# Patient Record
Sex: FEMALE | Race: WHITE
[De-identification: names, ages, dates, MRNs, and addresses within clinical notes are randomized per-mention and may not be internally consistent; named-entity substitution may affect disease eponyms.]

---

## 2017-10-06 ENCOUNTER — RX ONLY (RX ONLY)
Age: 57
End: 2017-10-06

## 2017-10-06 ENCOUNTER — DOCTOR'S OFFICE (OUTPATIENT)
Dept: URBAN - NONMETROPOLITAN AREA CLINIC 1 | Facility: CLINIC | Age: 57
Setting detail: OPHTHALMOLOGY
End: 2017-10-06
Payer: COMMERCIAL

## 2017-10-06 DIAGNOSIS — H00.11: ICD-10-CM

## 2017-10-06 PROCEDURE — 99203 OFFICE O/P NEW LOW 30 MIN: CPT | Performed by: OPHTHALMOLOGY

## 2017-10-06 ASSESSMENT — REFRACTION_MANIFEST
OD_VA3: 20/
OD_VA2: 20/25
OS_SPHERE: -1.00
OS_CYLINDER: SPH
OD_CYLINDER: -0.75
OD_VA1: 20/25
OS_VA1: 20/
OS_VA2: 20/
OS_VA3: 20/
OS_VA1: 20/
OD_VA1: 20/
OD_VA1: 20/
OS_VA2: 20/25
OS_VA3: 20/
OU_VA: 20/
OS_VA1: 20/25
OU_VA: 20/
OD_ADD: +2.00
OS_VA3: 20/
OD_SPHERE: -1.00
OU_VA: 20/
OD_VA2: 20/
OS_VA2: 20/
OD_VA2: 20/
OS_ADD: +2.00
OD_VA3: 20/
OD_VA3: 20/
OD_AXIS: 180

## 2017-10-06 ASSESSMENT — REFRACTION_CURRENTRX
OD_OVR_VA: 20/
OD_OVR_VA: 20/
OS_OVR_VA: 20/
OS_OVR_VA: 20/
OD_OVR_VA: 20/
OS_OVR_VA: 20/

## 2017-10-06 ASSESSMENT — VISUAL ACUITY
OS_BCVA: 20/50-2
OD_BCVA: 20/40

## 2017-10-06 ASSESSMENT — CONFRONTATIONAL VISUAL FIELD TEST (CVF)
OD_FINDINGS: FULL
OS_FINDINGS: FULL

## 2017-10-06 ASSESSMENT — REFRACTION_AUTOREFRACTION
OD_AXIS: 176
OS_SPHERE: -1.00
OS_CYLINDER: -0.25
OD_SPHERE: -1.00
OD_CYLINDER: -0.75
OS_AXIS: 65

## 2017-10-06 ASSESSMENT — SPHEQUIV_DERIVED
OD_SPHEQUIV: -1.375
OD_SPHEQUIV: -1.375
OS_SPHEQUIV: -1.125

## 2017-10-09 ENCOUNTER — DOCTOR'S OFFICE (OUTPATIENT)
Dept: URBAN - NONMETROPOLITAN AREA CLINIC 1 | Facility: CLINIC | Age: 57
Setting detail: OPHTHALMOLOGY
End: 2017-10-09
Payer: COMMERCIAL

## 2017-10-09 ENCOUNTER — RX ONLY (RX ONLY)
Age: 57
End: 2017-10-09

## 2017-10-09 DIAGNOSIS — H00.11: ICD-10-CM

## 2017-10-09 PROCEDURE — 92012 INTRM OPH EXAM EST PATIENT: CPT | Performed by: OPHTHALMOLOGY

## 2017-10-09 ASSESSMENT — REFRACTION_MANIFEST
OU_VA: 20/
OD_VA3: 20/
OD_CYLINDER: -0.75
OD_VA3: 20/
OD_VA1: 20/25
OD_VA2: 20/
OS_CYLINDER: SPH
OS_VA2: 20/
OS_VA3: 20/
OD_VA1: 20/
OD_ADD: +2.00
OS_VA3: 20/
OS_ADD: +2.00
OS_VA1: 20/25
OU_VA: 20/
OS_VA2: 20/
OS_VA2: 20/25
OS_VA1: 20/
OD_VA2: 20/25
OS_SPHERE: -1.00
OD_VA2: 20/
OS_VA3: 20/
OD_VA1: 20/
OD_SPHERE: -1.00
OS_VA1: 20/
OD_VA3: 20/
OD_AXIS: 180
OU_VA: 20/

## 2017-10-09 ASSESSMENT — REFRACTION_CURRENTRX
OD_OVR_VA: 20/
OD_OVR_VA: 20/
OS_OVR_VA: 20/
OD_OVR_VA: 20/

## 2017-10-09 ASSESSMENT — REFRACTION_AUTOREFRACTION
OS_SPHERE: -1.00
OS_AXIS: 65
OD_CYLINDER: -0.75
OD_SPHERE: -1.00
OD_AXIS: 176
OS_CYLINDER: -0.25

## 2017-10-09 ASSESSMENT — SPHEQUIV_DERIVED
OS_SPHEQUIV: -1.125
OD_SPHEQUIV: -1.375
OD_SPHEQUIV: -1.375

## 2017-10-09 ASSESSMENT — VISUAL ACUITY
OS_BCVA: 20/50-2
OD_BCVA: 20/30-1

## 2017-12-11 ENCOUNTER — RX ONLY (RX ONLY)
Age: 57
End: 2017-12-11

## 2017-12-11 ENCOUNTER — DOCTOR'S OFFICE (OUTPATIENT)
Dept: URBAN - NONMETROPOLITAN AREA CLINIC 1 | Facility: CLINIC | Age: 57
Setting detail: OPHTHALMOLOGY
End: 2017-12-11
Payer: COMMERCIAL

## 2017-12-11 DIAGNOSIS — H43.812: ICD-10-CM

## 2017-12-11 DIAGNOSIS — H00.11: ICD-10-CM

## 2017-12-11 DIAGNOSIS — H44.23: ICD-10-CM

## 2017-12-11 DIAGNOSIS — H43.811: ICD-10-CM

## 2017-12-11 DIAGNOSIS — H43.813: ICD-10-CM

## 2017-12-11 PROCEDURE — 92014 COMPRE OPH EXAM EST PT 1/>: CPT | Performed by: OPHTHALMOLOGY

## 2017-12-11 PROCEDURE — 92225 OPHTHALMOSCOPY EXTENDED INITIAL: CPT | Performed by: OPHTHALMOLOGY

## 2017-12-11 ASSESSMENT — REFRACTION_MANIFEST
OD_VA1: 20/25
OD_VA3: 20/
OD_VA3: 20/
OS_VA3: 20/
OU_VA: 20/
OD_VA2: 20/
OS_ADD: +2.00
OS_VA1: 20/
OU_VA: 20/
OD_CYLINDER: -0.75
OS_CYLINDER: SPH
OD_SPHERE: -1.00
OS_VA3: 20/
OS_VA2: 20/
OS_VA3: 20/
OD_VA1: 20/
OS_VA2: 20/
OD_AXIS: 180
OD_VA3: 20/
OD_VA2: 20/
OS_VA2: 20/25
OD_VA1: 20/
OD_VA2: 20/25
OS_VA1: 20/25
OD_ADD: +2.00
OS_SPHERE: -1.00
OS_VA1: 20/
OU_VA: 20/

## 2017-12-11 ASSESSMENT — REFRACTION_AUTOREFRACTION
OS_CYLINDER: -0.25
OD_CYLINDER: -0.75
OD_SPHERE: -1.00
OS_AXIS: 65
OS_SPHERE: -1.00
OD_AXIS: 176

## 2017-12-11 ASSESSMENT — REFRACTION_CURRENTRX
OD_OVR_VA: 20/
OS_OVR_VA: 20/
OS_OVR_VA: 20/
OD_OVR_VA: 20/
OD_OVR_VA: 20/
OS_OVR_VA: 20/

## 2017-12-11 ASSESSMENT — VISUAL ACUITY
OS_BCVA: 20/50-2
OD_BCVA: 20/25

## 2017-12-11 ASSESSMENT — SPHEQUIV_DERIVED
OD_SPHEQUIV: -1.375
OS_SPHEQUIV: -1.125
OD_SPHEQUIV: -1.375

## 2017-12-11 ASSESSMENT — CONFRONTATIONAL VISUAL FIELD TEST (CVF)
OD_FINDINGS: FULL
OS_FINDINGS: FULL

## 2018-01-08 ENCOUNTER — OPTICAL OFFICE (OUTPATIENT)
Dept: URBAN - NONMETROPOLITAN AREA CLINIC 4 | Facility: CLINIC | Age: 58
Setting detail: OPHTHALMOLOGY
End: 2018-01-08
Payer: COMMERCIAL

## 2018-01-08 ENCOUNTER — DOCTOR'S OFFICE (OUTPATIENT)
Dept: URBAN - NONMETROPOLITAN AREA CLINIC 1 | Facility: CLINIC | Age: 58
Setting detail: OPHTHALMOLOGY
End: 2018-01-08
Payer: COMMERCIAL

## 2018-01-08 DIAGNOSIS — H52.13: ICD-10-CM

## 2018-01-08 DIAGNOSIS — H52.4: ICD-10-CM

## 2018-01-08 PROBLEM — H44.23 MYOPIC DEGENERATION; BOTH EYES: Status: ACTIVE | Noted: 2017-12-11

## 2018-01-08 PROBLEM — H43.811 POST VITREOUS DETACHMENT; RIGHT EYE, LEFT EYE: Status: ACTIVE | Noted: 2017-12-11

## 2018-01-08 PROBLEM — H43.812 POST VITREOUS DETACHMENT; RIGHT EYE, LEFT EYE: Status: ACTIVE | Noted: 2017-12-11

## 2018-01-08 PROBLEM — H25.13 CATARACT NUCLEAR SCLEROSIS; BOTH EYES: Status: ACTIVE | Noted: 2017-12-11

## 2018-01-08 PROBLEM — H00.11 CHALAZION; RIGHT UPPER LID: Status: ACTIVE | Noted: 2017-10-06

## 2018-01-08 PROCEDURE — V2200 LENS SPHER BIFOC PLANO 4.00D: HCPCS | Performed by: OPTOMETRIST

## 2018-01-08 PROCEDURE — 92015 DETERMINE REFRACTIVE STATE: CPT | Performed by: OPTOMETRIST

## 2018-01-08 PROCEDURE — V2020 VISION SVCS FRAMES PURCHASES: HCPCS | Performed by: OPTOMETRIST

## 2018-01-08 ASSESSMENT — REFRACTION_MANIFEST
OD_VA3: 20/
OD_VA1: 20/
OD_VA2: 20/
OS_VA1: 20/
OD_VA1: 20/
OU_VA: 20/
OD_VA2: 20/
OS_VA3: 20/
OS_VA2: 20/
OS_VA3: 20/
OD_VA3: 20/
OU_VA: 20/
OS_VA2: 20/
OS_VA1: 20/

## 2018-01-08 ASSESSMENT — REFRACTION_CURRENTRX
OS_OVR_VA: 20/
OS_OVR_VA: 20/
OD_OVR_VA: 20/
OS_OVR_VA: 20/

## 2018-01-08 ASSESSMENT — REFRACTION_OUTSIDERX
OD_VA3: 20/
OS_SPHERE: -0.75
OS_ADD: +2.25
OD_ADD: +2.25
OD_VA1: 20/20
OU_VA: 20/20
OS_VA3: 20/
OD_VA2: 20/25
OD_SPHERE: -0.75
OS_VA2: 20/25
OS_VA1: 20/20

## 2018-01-08 ASSESSMENT — REFRACTION_AUTOREFRACTION
OD_SPHERE: -0.50
OS_CYLINDER: -1.00
OD_CYLINDER: -0.75
OS_AXIS: 172
OD_AXIS: 7
OS_SPHERE: -0.50

## 2018-01-08 ASSESSMENT — SPHEQUIV_DERIVED
OD_SPHEQUIV: -0.875
OS_SPHEQUIV: -1

## 2018-01-08 ASSESSMENT — VISUAL ACUITY
OD_BCVA: 20/30-1
OS_BCVA: 20/50-2

## 2020-01-31 DIAGNOSIS — M54.2 CERVICALGIA: ICD-10-CM

## 2020-01-31 DIAGNOSIS — I10 ESSENTIAL (PRIMARY) HYPERTENSION: ICD-10-CM

## 2020-01-31 DIAGNOSIS — R00.2 PALPITATIONS: ICD-10-CM

## 2020-01-31 DIAGNOSIS — R51.9 HEADACHE: ICD-10-CM

## 2020-01-31 DIAGNOSIS — D35.01 BENIGN NEOPLASM OF RIGHT ADRENAL GLAND: ICD-10-CM

## 2020-01-31 DIAGNOSIS — R20.2 PARESTHESIA OF SKIN: ICD-10-CM

## 2020-01-31 DIAGNOSIS — R20.0 ANESTHESIA OF SKIN: ICD-10-CM

## 2020-01-31 DIAGNOSIS — E83.52 HYPERCALCEMIA: ICD-10-CM

## 2020-01-31 DIAGNOSIS — R47.9 UNSPECIFIED SPEECH DISTURBANCES: ICD-10-CM

## 2020-01-31 DIAGNOSIS — E21.3 HYPERPARATHYROIDISM, UNSPECIFIED (HCC): ICD-10-CM

## 2020-11-25 ENCOUNTER — TRANSCRIBE ORDERS (OUTPATIENT)
Dept: ADMINISTRATIVE | Facility: HOSPITAL | Age: 60
End: 2020-11-25

## 2020-11-25 ENCOUNTER — HOSPITAL ENCOUNTER (OUTPATIENT)
Dept: NUCLEAR MEDICINE | Facility: HOSPITAL | Age: 60
Discharge: HOME | End: 2020-11-25
Payer: COMMERCIAL

## 2020-11-25 DIAGNOSIS — F17.200 SMOKER: Primary | ICD-10-CM

## 2020-11-25 DIAGNOSIS — E83.52 HYPERCALCEMIA: ICD-10-CM

## 2020-11-25 PROCEDURE — 78071 PARATHYRD PLANAR W/WO SUBTRJ: CPT

## 2020-11-25 PROCEDURE — A9500 TC99M SESTAMIBI: HCPCS

## 2020-12-01 ENCOUNTER — HOSPITAL ENCOUNTER (OUTPATIENT)
Dept: CT IMAGING | Facility: HOSPITAL | Age: 60
Discharge: HOME | End: 2020-12-01
Payer: COMMERCIAL

## 2020-12-01 DIAGNOSIS — F17.200 SMOKER: ICD-10-CM

## 2020-12-01 PROCEDURE — G0297 LDCT FOR LUNG CA SCREEN: HCPCS

## 2021-03-10 DIAGNOSIS — Z23 ENCOUNTER FOR IMMUNIZATION: ICD-10-CM

## 2021-04-02 ENCOUNTER — HOSPITAL ENCOUNTER (OUTPATIENT)
Dept: ULTRASOUND IMAGING | Facility: HOSPITAL | Age: 61
Discharge: HOME | End: 2021-04-02
Payer: COMMERCIAL

## 2021-04-02 ENCOUNTER — HOSPITAL ENCOUNTER (OUTPATIENT)
Dept: NON INVASIVE DIAGNOSTICS | Facility: HOSPITAL | Age: 61
Discharge: HOME | End: 2021-04-02
Payer: COMMERCIAL

## 2021-04-02 DIAGNOSIS — I10 ESSENTIAL (PRIMARY) HYPERTENSION: ICD-10-CM

## 2021-04-02 DIAGNOSIS — M54.2 CERVICALGIA: ICD-10-CM

## 2021-04-02 DIAGNOSIS — R00.2 PALPITATIONS: ICD-10-CM

## 2021-04-02 PROCEDURE — 93880 EXTRACRANIAL BILAT STUDY: CPT

## 2021-04-02 PROCEDURE — 93880 EXTRACRANIAL BILAT STUDY: CPT | Performed by: SURGERY

## 2021-04-02 PROCEDURE — 76536 US EXAM OF HEAD AND NECK: CPT

## 2021-04-16 ENCOUNTER — IMMUNIZATIONS (OUTPATIENT)
Dept: FAMILY MEDICINE CLINIC | Facility: HOSPITAL | Age: 61
End: 2021-04-16

## 2021-04-16 DIAGNOSIS — Z23 ENCOUNTER FOR IMMUNIZATION: Primary | ICD-10-CM

## 2021-04-16 PROCEDURE — 91300 SARS-COV-2 / COVID-19 MRNA VACCINE (PFIZER-BIONTECH) 30 MCG: CPT

## 2021-04-16 PROCEDURE — 0001A SARS-COV-2 / COVID-19 MRNA VACCINE (PFIZER-BIONTECH) 30 MCG: CPT

## 2021-05-06 ENCOUNTER — IMMUNIZATIONS (OUTPATIENT)
Dept: FAMILY MEDICINE CLINIC | Facility: HOSPITAL | Age: 61
End: 2021-05-06
Payer: COMMERCIAL

## 2021-05-06 ENCOUNTER — APPOINTMENT (OUTPATIENT)
Dept: LAB | Facility: HOSPITAL | Age: 61
End: 2021-05-06
Attending: OTOLARYNGOLOGY
Payer: COMMERCIAL

## 2021-05-06 ENCOUNTER — TRANSCRIBE ORDERS (OUTPATIENT)
Dept: ADMINISTRATIVE | Facility: HOSPITAL | Age: 61
End: 2021-05-06

## 2021-05-06 DIAGNOSIS — R94.4 NONSPECIFIC ABNORMAL RESULTS OF KIDNEY FUNCTION STUDY: ICD-10-CM

## 2021-05-06 DIAGNOSIS — Z23 ENCOUNTER FOR IMMUNIZATION: Primary | ICD-10-CM

## 2021-05-06 DIAGNOSIS — R49.0 DYSPHONIA: Primary | ICD-10-CM

## 2021-05-06 LAB
BUN SERPL-MCNC: 19 MG/DL (ref 5–25)
CREAT SERPL-MCNC: 1.03 MG/DL (ref 0.6–1.3)
GFR SERPL CREATININE-BSD FRML MDRD: 60 ML/MIN/1.73SQ M

## 2021-05-06 PROCEDURE — 91300 SARS-COV-2 / COVID-19 MRNA VACCINE (PFIZER-BIONTECH) 30 MCG: CPT

## 2021-05-06 PROCEDURE — 84520 ASSAY OF UREA NITROGEN: CPT

## 2021-05-06 PROCEDURE — 82565 ASSAY OF CREATININE: CPT

## 2021-05-06 PROCEDURE — 0002A SARS-COV-2 / COVID-19 MRNA VACCINE (PFIZER-BIONTECH) 30 MCG: CPT

## 2021-05-06 PROCEDURE — 36415 COLL VENOUS BLD VENIPUNCTURE: CPT

## 2021-05-18 ENCOUNTER — HOSPITAL ENCOUNTER (OUTPATIENT)
Dept: CT IMAGING | Facility: HOSPITAL | Age: 61
Discharge: HOME | End: 2021-05-18
Attending: OTOLARYNGOLOGY
Payer: COMMERCIAL

## 2021-05-18 DIAGNOSIS — R49.0 DYSPHONIA: ICD-10-CM

## 2021-05-18 PROCEDURE — G1004 CDSM NDSC: HCPCS

## 2021-05-18 PROCEDURE — 70491 CT SOFT TISSUE NECK W/DYE: CPT

## 2021-05-18 RX ADMIN — IOHEXOL 85 ML: 350 INJECTION, SOLUTION INTRAVENOUS at 08:37

## 2021-08-24 ENCOUNTER — APPOINTMENT (OUTPATIENT)
Dept: LAB | Facility: HOSPITAL | Age: 61
End: 2021-08-24
Payer: COMMERCIAL

## 2021-08-24 ENCOUNTER — HOSPITAL ENCOUNTER (OUTPATIENT)
Dept: RADIOLOGY | Facility: HOSPITAL | Age: 61
Discharge: HOME | End: 2021-08-24
Attending: OTOLARYNGOLOGY
Payer: COMMERCIAL

## 2021-08-24 ENCOUNTER — HOSPITAL ENCOUNTER (OUTPATIENT)
Dept: RADIOLOGY | Facility: CLINIC | Age: 61
Discharge: HOME | End: 2021-08-24
Payer: COMMERCIAL

## 2021-08-24 DIAGNOSIS — E83.52 HYPERCALCEMIA: ICD-10-CM

## 2021-08-24 DIAGNOSIS — E21.3 HYPERPARATHYROIDISM (HCC): ICD-10-CM

## 2021-08-24 DIAGNOSIS — D35.01 ADRENAL ADENOMA, RIGHT: ICD-10-CM

## 2021-08-24 DIAGNOSIS — R06.2 WHEEZING: ICD-10-CM

## 2021-08-24 DIAGNOSIS — E21.3 HYPERPARATHYROIDISM, UNSPECIFIED (HCC): ICD-10-CM

## 2021-08-24 DIAGNOSIS — D35.01 BENIGN NEOPLASM OF RIGHT ADRENAL GLAND: ICD-10-CM

## 2021-08-24 PROCEDURE — 71046 X-RAY EXAM CHEST 2 VIEWS: CPT

## 2021-08-24 PROCEDURE — 77080 DXA BONE DENSITY AXIAL: CPT

## 2021-08-30 ENCOUNTER — APPOINTMENT (OUTPATIENT)
Dept: LAB | Facility: HOSPITAL | Age: 61
End: 2021-08-30
Payer: COMMERCIAL

## 2021-08-30 ENCOUNTER — HOSPITAL ENCOUNTER (OUTPATIENT)
Dept: CT IMAGING | Facility: HOSPITAL | Age: 61
Discharge: HOME | End: 2021-08-30
Payer: COMMERCIAL

## 2021-08-30 DIAGNOSIS — E21.3 HYPERPARATHYROIDISM, UNSPECIFIED (HCC): ICD-10-CM

## 2021-08-30 DIAGNOSIS — D35.01 BENIGN NEOPLASM OF RIGHT ADRENAL GLAND: ICD-10-CM

## 2021-08-30 DIAGNOSIS — E83.52 HYPERCALCEMIA: ICD-10-CM

## 2021-08-30 LAB
25(OH)D3 SERPL-MCNC: 60.6 NG/ML (ref 30–100)
ALBUMIN SERPL BCP-MCNC: 3.9 G/DL (ref 3.5–5)
ANION GAP SERPL CALCULATED.3IONS-SCNC: 12 MMOL/L (ref 4–13)
BUN SERPL-MCNC: 25 MG/DL (ref 5–25)
CALCIUM SERPL-MCNC: 10.2 MG/DL (ref 8.3–10.1)
CHLORIDE SERPL-SCNC: 108 MMOL/L (ref 100–108)
CO2 SERPL-SCNC: 23 MMOL/L (ref 21–32)
CREAT SERPL-MCNC: 1.22 MG/DL (ref 0.6–1.3)
GFR SERPL CREATININE-BSD FRML MDRD: 49 ML/MIN/1.73SQ M
GLUCOSE P FAST SERPL-MCNC: 104 MG/DL (ref 65–99)
PHOSPHATE SERPL-MCNC: 3.6 MG/DL (ref 2.7–4.5)
POTASSIUM SERPL-SCNC: 4.5 MMOL/L (ref 3.5–5.3)
PTH-INTACT SERPL-MCNC: 104.1 PG/ML (ref 18.4–80.1)
SODIUM SERPL-SCNC: 143 MMOL/L (ref 136–145)
TSH SERPL DL<=0.05 MIU/L-ACNC: 2.29 UIU/ML (ref 0.36–3.74)

## 2021-08-30 PROCEDURE — 82306 VITAMIN D 25 HYDROXY: CPT

## 2021-08-30 PROCEDURE — 74170 CT ABD WO CNTRST FLWD CNTRST: CPT

## 2021-08-30 PROCEDURE — 80069 RENAL FUNCTION PANEL: CPT

## 2021-08-30 PROCEDURE — 84443 ASSAY THYROID STIM HORMONE: CPT

## 2021-08-30 PROCEDURE — 83970 ASSAY OF PARATHORMONE: CPT

## 2021-08-30 RX ADMIN — IOHEXOL 100 ML: 350 INJECTION, SOLUTION INTRAVENOUS at 08:32

## 2021-09-01 ENCOUNTER — APPOINTMENT (OUTPATIENT)
Dept: LAB | Facility: HOSPITAL | Age: 61
End: 2021-09-01
Payer: COMMERCIAL

## 2021-09-01 DIAGNOSIS — D35.01 ADRENAL ADENOMA, RIGHT: ICD-10-CM

## 2021-09-01 DIAGNOSIS — E21.3 HYPERPARATHYROIDISM (HCC): ICD-10-CM

## 2021-09-01 DIAGNOSIS — E83.52 HYPERCALCEMIA: ICD-10-CM

## 2021-09-01 LAB
ALBUMIN SERPL BCP-MCNC: 3.5 G/DL (ref 3.5–5)
ANION GAP SERPL CALCULATED.3IONS-SCNC: 10 MMOL/L (ref 4–13)
BUN SERPL-MCNC: 22 MG/DL (ref 5–25)
CALCIUM SERPL-MCNC: 9.9 MG/DL (ref 8.3–10.1)
CHLORIDE SERPL-SCNC: 108 MMOL/L (ref 100–108)
CO2 SERPL-SCNC: 24 MMOL/L (ref 21–32)
CREAT SERPL-MCNC: 0.99 MG/DL (ref 0.6–1.3)
GFR SERPL CREATININE-BSD FRML MDRD: 63 ML/MIN/1.73SQ M
GLUCOSE P FAST SERPL-MCNC: 107 MG/DL (ref 65–99)
PHOSPHATE SERPL-MCNC: 3 MG/DL (ref 2.7–4.5)
POTASSIUM SERPL-SCNC: 4.2 MMOL/L (ref 3.5–5.3)
SODIUM SERPL-SCNC: 142 MMOL/L (ref 136–145)

## 2021-09-01 PROCEDURE — 82308 ASSAY OF CALCITONIN: CPT

## 2021-09-01 PROCEDURE — 83835 ASSAY OF METANEPHRINES: CPT

## 2021-09-01 PROCEDURE — 80069 RENAL FUNCTION PANEL: CPT

## 2021-09-04 LAB — CALCIT SERPL-MCNC: 2.3 PG/ML (ref 0–5)

## 2021-09-09 ENCOUNTER — APPOINTMENT (OUTPATIENT)
Dept: LAB | Facility: HOSPITAL | Age: 61
End: 2021-09-09
Payer: COMMERCIAL

## 2021-09-09 LAB
CALCIUM 24H UR-MCNC: 72.15 MG/24 HRS (ref 42–353)
CREAT 24H UR-MRATE: 1 G/24HR (ref 0.6–1.8)
SPECIMEN VOL UR: 650 ML
SPECIMEN VOL UR: 650 ML

## 2021-09-09 PROCEDURE — 82340 ASSAY OF CALCIUM IN URINE: CPT

## 2021-09-09 PROCEDURE — 82570 ASSAY OF URINE CREATININE: CPT

## 2021-09-09 PROCEDURE — 82530 CORTISOL FREE: CPT

## 2021-09-10 LAB
METANEPH FREE SERPL-MCNC: 67.3 PG/ML (ref 0–88)
NORMETANEPHRINE SERPL-MCNC: 113.5 PG/ML (ref 0–136.8)

## 2021-09-16 LAB
CORTIS F 24H UR-MRATE: 20 UG/24 HR (ref 6–42)
CORTIS F UR-MCNC: 30 UG/L

## 2021-10-18 ENCOUNTER — APPOINTMENT (OUTPATIENT)
Dept: LAB | Facility: HOSPITAL | Age: 61
End: 2021-10-18
Payer: COMMERCIAL

## 2021-10-18 DIAGNOSIS — E21.0 PRIMARY HYPERPARATHYROIDISM (HCC): ICD-10-CM

## 2021-10-18 DIAGNOSIS — Z01.818 PRE-OP EVALUATION: ICD-10-CM

## 2021-10-18 LAB
BUN SERPL-MCNC: 22 MG/DL (ref 5–25)
CREAT SERPL-MCNC: 0.9 MG/DL (ref 0.6–1.3)
GFR SERPL CREATININE-BSD FRML MDRD: 70 ML/MIN/1.73SQ M

## 2021-10-18 PROCEDURE — 84520 ASSAY OF UREA NITROGEN: CPT

## 2021-10-18 PROCEDURE — 36415 COLL VENOUS BLD VENIPUNCTURE: CPT

## 2021-10-18 PROCEDURE — 82565 ASSAY OF CREATININE: CPT

## 2021-12-07 ENCOUNTER — OFFICE VISIT (OUTPATIENT)
Dept: LAB | Facility: HOSPITAL | Age: 61
End: 2021-12-07
Payer: COMMERCIAL

## 2021-12-07 DIAGNOSIS — Z01.818 PREOP TESTING: ICD-10-CM

## 2021-12-07 LAB
ATRIAL RATE: 50 BPM
P AXIS: 66 DEGREES
PR INTERVAL: 138 MS
QRS AXIS: 83 DEGREES
QRSD INTERVAL: 100 MS
QT INTERVAL: 408 MS
QTC INTERVAL: 371 MS
T WAVE AXIS: 74 DEGREES
VENTRICULAR RATE: 50 BPM

## 2021-12-07 PROCEDURE — 93005 ELECTROCARDIOGRAM TRACING: CPT

## 2021-12-07 PROCEDURE — 93010 ELECTROCARDIOGRAM REPORT: CPT | Performed by: INTERNAL MEDICINE

## 2022-01-19 ENCOUNTER — HOSPITAL ENCOUNTER (OUTPATIENT)
Dept: RADIOLOGY | Facility: HOSPITAL | Age: 62
Discharge: HOME | End: 2022-01-19
Payer: COMMERCIAL

## 2022-01-19 DIAGNOSIS — M70.62 TROCHANTERIC BURSITIS OF LEFT HIP: ICD-10-CM

## 2022-01-19 DIAGNOSIS — M25.552 HIP PAIN, LEFT: ICD-10-CM

## 2022-01-19 PROCEDURE — 73502 X-RAY EXAM HIP UNI 2-3 VIEWS: CPT

## 2022-07-13 ENCOUNTER — OFFICE VISIT (OUTPATIENT)
Dept: LAB | Facility: HOSPITAL | Age: 62
End: 2022-07-13
Payer: COMMERCIAL

## 2022-07-13 DIAGNOSIS — Z01.818 PREOP TESTING: ICD-10-CM

## 2022-07-13 PROCEDURE — 93005 ELECTROCARDIOGRAM TRACING: CPT

## 2022-07-14 LAB
ATRIAL RATE: 43 BPM
P AXIS: 69 DEGREES
PR INTERVAL: 146 MS
QRS AXIS: 86 DEGREES
QRSD INTERVAL: 94 MS
QT INTERVAL: 426 MS
QTC INTERVAL: 359 MS
T WAVE AXIS: 71 DEGREES
VENTRICULAR RATE: 43 BPM

## 2023-08-20 ENCOUNTER — HOSPITAL ENCOUNTER (EMERGENCY)
Facility: HOSPITAL | Age: 63
Discharge: HOME | End: 2023-08-20
Attending: EMERGENCY MEDICINE | Admitting: EMERGENCY MEDICINE
Payer: COMMERCIAL

## 2023-08-20 ENCOUNTER — APPOINTMENT (EMERGENCY)
Dept: CT IMAGING | Facility: HOSPITAL | Age: 63
End: 2023-08-20
Payer: COMMERCIAL

## 2023-08-20 VITALS
WEIGHT: 175 LBS | RESPIRATION RATE: 20 BRPM | SYSTOLIC BLOOD PRESSURE: 106 MMHG | TEMPERATURE: 97.8 F | OXYGEN SATURATION: 95 % | DIASTOLIC BLOOD PRESSURE: 65 MMHG | HEART RATE: 72 BPM

## 2023-08-20 DIAGNOSIS — T14.8XXA MUSCLE STRAIN: Primary | ICD-10-CM

## 2023-08-20 DIAGNOSIS — R74.8 ELEVATED CREATINE KINASE: ICD-10-CM

## 2023-08-20 LAB
2HR DELTA HS TROPONIN: 0 NG/L
ALBUMIN SERPL BCP-MCNC: 4.5 G/DL (ref 3.5–5)
ALP SERPL-CCNC: 59 U/L (ref 34–104)
ALT SERPL W P-5'-P-CCNC: 33 U/L (ref 7–52)
ANION GAP SERPL CALCULATED.3IONS-SCNC: 9 MMOL/L
APTT PPP: 30 SECONDS (ref 23–37)
AST SERPL W P-5'-P-CCNC: 23 U/L (ref 13–39)
BASOPHILS # BLD AUTO: 0.04 THOUSANDS/ÂΜL (ref 0–0.1)
BASOPHILS NFR BLD AUTO: 1 % (ref 0–1)
BILIRUB SERPL-MCNC: 0.41 MG/DL (ref 0.2–1)
BNP SERPL-MCNC: 52 PG/ML (ref 0–100)
BUN SERPL-MCNC: 35 MG/DL (ref 5–25)
CALCIUM SERPL-MCNC: 9.1 MG/DL (ref 8.4–10.2)
CARDIAC TROPONIN I PNL SERPL HS: 3 NG/L
CARDIAC TROPONIN I PNL SERPL HS: 3 NG/L
CHLORIDE SERPL-SCNC: 111 MMOL/L (ref 96–108)
CO2 SERPL-SCNC: 22 MMOL/L (ref 21–32)
CREAT SERPL-MCNC: 1.73 MG/DL (ref 0.6–1.3)
EOSINOPHIL # BLD AUTO: 0.11 THOUSAND/ÂΜL (ref 0–0.61)
EOSINOPHIL NFR BLD AUTO: 2 % (ref 0–6)
ERYTHROCYTE [DISTWIDTH] IN BLOOD BY AUTOMATED COUNT: 13 % (ref 11.6–15.1)
GFR SERPL CREATININE-BSD FRML MDRD: 31 ML/MIN/1.73SQ M
GLUCOSE SERPL-MCNC: 90 MG/DL (ref 65–140)
HCT VFR BLD AUTO: 38 % (ref 34.8–46.1)
HGB BLD-MCNC: 12.4 G/DL (ref 11.5–15.4)
IMM GRANULOCYTES # BLD AUTO: 0.02 THOUSAND/UL (ref 0–0.2)
IMM GRANULOCYTES NFR BLD AUTO: 0 % (ref 0–2)
INR PPP: 0.93 (ref 0.84–1.19)
LYMPHOCYTES # BLD AUTO: 2.06 THOUSANDS/ÂΜL (ref 0.6–4.47)
LYMPHOCYTES NFR BLD AUTO: 33 % (ref 14–44)
MCH RBC QN AUTO: 32.5 PG (ref 26.8–34.3)
MCHC RBC AUTO-ENTMCNC: 32.6 G/DL (ref 31.4–37.4)
MCV RBC AUTO: 100 FL (ref 82–98)
MONOCYTES # BLD AUTO: 0.54 THOUSAND/ÂΜL (ref 0.17–1.22)
MONOCYTES NFR BLD AUTO: 9 % (ref 4–12)
NEUTROPHILS # BLD AUTO: 3.44 THOUSANDS/ÂΜL (ref 1.85–7.62)
NEUTS SEG NFR BLD AUTO: 55 % (ref 43–75)
NRBC BLD AUTO-RTO: 0 /100 WBCS
PLATELET # BLD AUTO: 249 THOUSANDS/UL (ref 149–390)
PMV BLD AUTO: 10.7 FL (ref 8.9–12.7)
POTASSIUM SERPL-SCNC: 4.4 MMOL/L (ref 3.5–5.3)
PROT SERPL-MCNC: 6.8 G/DL (ref 6.4–8.4)
PROTHROMBIN TIME: 12.8 SECONDS (ref 11.6–14.5)
RBC # BLD AUTO: 3.81 MILLION/UL (ref 3.81–5.12)
SODIUM SERPL-SCNC: 142 MMOL/L (ref 135–147)
WBC # BLD AUTO: 6.21 THOUSAND/UL (ref 4.31–10.16)

## 2023-08-20 PROCEDURE — 36415 COLL VENOUS BLD VENIPUNCTURE: CPT | Performed by: PHYSICIAN ASSISTANT

## 2023-08-20 PROCEDURE — 93005 ELECTROCARDIOGRAM TRACING: CPT

## 2023-08-20 PROCEDURE — 80053 COMPREHEN METABOLIC PANEL: CPT | Performed by: PHYSICIAN ASSISTANT

## 2023-08-20 PROCEDURE — 83880 ASSAY OF NATRIURETIC PEPTIDE: CPT | Performed by: PHYSICIAN ASSISTANT

## 2023-08-20 PROCEDURE — 71250 CT THORAX DX C-: CPT

## 2023-08-20 PROCEDURE — 84484 ASSAY OF TROPONIN QUANT: CPT | Performed by: PHYSICIAN ASSISTANT

## 2023-08-20 PROCEDURE — 99285 EMERGENCY DEPT VISIT HI MDM: CPT

## 2023-08-20 PROCEDURE — 99285 EMERGENCY DEPT VISIT HI MDM: CPT | Performed by: PHYSICIAN ASSISTANT

## 2023-08-20 PROCEDURE — 85730 THROMBOPLASTIN TIME PARTIAL: CPT | Performed by: PHYSICIAN ASSISTANT

## 2023-08-20 PROCEDURE — 96376 TX/PRO/DX INJ SAME DRUG ADON: CPT

## 2023-08-20 PROCEDURE — 85025 COMPLETE CBC W/AUTO DIFF WBC: CPT | Performed by: PHYSICIAN ASSISTANT

## 2023-08-20 PROCEDURE — 85610 PROTHROMBIN TIME: CPT | Performed by: PHYSICIAN ASSISTANT

## 2023-08-20 PROCEDURE — 96374 THER/PROPH/DIAG INJ IV PUSH: CPT

## 2023-08-20 PROCEDURE — G1004 CDSM NDSC: HCPCS

## 2023-08-20 RX ORDER — LIDOCAINE 50 MG/G
1 PATCH TOPICAL DAILY
Qty: 6 PATCH | Refills: 0 | Status: SHIPPED | OUTPATIENT
Start: 2023-08-20

## 2023-08-20 RX ORDER — MORPHINE SULFATE 4 MG/ML
4 INJECTION, SOLUTION INTRAMUSCULAR; INTRAVENOUS ONCE
Status: COMPLETED | OUTPATIENT
Start: 2023-08-20 | End: 2023-08-20

## 2023-08-20 RX ORDER — LIDOCAINE 50 MG/G
1 PATCH TOPICAL ONCE
Status: DISCONTINUED | OUTPATIENT
Start: 2023-08-20 | End: 2023-08-20 | Stop reason: HOSPADM

## 2023-08-20 RX ORDER — NAPROXEN 500 MG/1
500 TABLET ORAL 2 TIMES DAILY WITH MEALS
Qty: 10 TABLET | Refills: 0 | Status: SHIPPED | OUTPATIENT
Start: 2023-08-20 | End: 2023-08-25

## 2023-08-20 RX ADMIN — MORPHINE SULFATE 4 MG: 4 INJECTION INTRAVENOUS at 20:25

## 2023-08-20 RX ADMIN — MORPHINE SULFATE 4 MG: 4 INJECTION INTRAVENOUS at 18:15

## 2023-08-20 RX ADMIN — LIDOCAINE 5% 1 PATCH: 700 PATCH TOPICAL at 18:15

## 2023-08-20 NOTE — Clinical Note
Carmelo Freire was seen and treated in our emergency department on 8/20/2023. Diagnosis:     Severo Camel  may return to work on return date. She may return on this date: 08/22/2023    Should not lift greater than 5 pounds until 8/28/23     If you have any questions or concerns, please don't hesitate to call.       Donette Harada, PA-C    ______________________________           _______________          _______________  Hospital Representative                              Date                                Time

## 2023-08-20 NOTE — ED ATTENDING ATTESTATION
8/20/2023  I, Gene Christensen MD, saw and evaluated the patient. I have discussed the patient with the resident/non-physician practitioner and agree with the resident's/non-physician practitioner's findings, Plan of Care, and MDM as documented in the resident's/non-physician practitioner's note, except where noted. All available labs and Radiology studies were reviewed. I was present for key portions of any procedure(s) performed by the resident/non-physician practitioner and I was immediately available to provide assistance. At this point I agree with the current assessment done in the Emergency Department. I have conducted an independent evaluation of this patient a history and physical is as follows:    ED Course     Patient was pulling a corn stock out of the ground on Friday and developed sudden onset of left-sided pain. Felt a pop. Hurts to take a deep breath. Hurts to move. On exam the patient is awake alert. Lungs are clear to auscultation. Chest is tender to palpation along the lateral aspect of the left lower ribs. No obvious deformity or crepitus noted. Abdomen soft nontender good bowel sounds.     Critical Care Time  Procedures

## 2023-08-20 NOTE — ED PROVIDER NOTES
History  Chief Complaint   Patient presents with   • Chest Pain     Pt reports L chest wall pain beginning Friday after pulling a corn stalk out of the ground. Reports pain, SOB at triage      53-year-old female presents the emergency department for evaluation of left sided rib/chest wall pain. Patient states this began suddenly Friday after pulling out of cornstarch. States she had sharp shooting pain immediately and has been consistently worsening since. Patient reports shortness of breath. States pain is worse with certain movements or positions. She denies any palpitations or leg swelling. Denies nausea or vomiting. She has been taking 800 mg Motrin with no significant improvement of symptoms. Is a current everyday smoker. None       History reviewed. No pertinent past medical history. History reviewed. No pertinent surgical history. History reviewed. No pertinent family history. I have reviewed and agree with the history as documented. E-Cigarette/Vaping     E-Cigarette/Vaping Substances     Social History     Tobacco Use   • Smoking status: Every Day     Packs/day: 1.50     Types: Cigarettes   • Smokeless tobacco: Never   Substance Use Topics   • Alcohol use: Yes     Comment: social   • Drug use: Never       Review of Systems   Constitutional: Negative for appetite change, fatigue and fever. Respiratory: Positive for shortness of breath. Cardiovascular: Negative for palpitations and leg swelling. Chest wall pain   Gastrointestinal: Negative. Musculoskeletal: Negative. Skin: Negative. Neurological: Negative. All other systems reviewed and are negative. Physical Exam  Physical Exam  Vitals and nursing note reviewed. Constitutional:       General: She is not in acute distress. Appearance: She is well-developed. She is not ill-appearing, toxic-appearing or diaphoretic. HENT:      Head: Normocephalic.    Eyes:      Pupils: Pupils are equal, round, and reactive to light. Cardiovascular:      Rate and Rhythm: Normal rate and regular rhythm. Pulmonary:      Effort: Tachypnea present. Breath sounds: No decreased breath sounds, wheezing, rhonchi or rales. Chest:      Chest wall: Tenderness present. Comments: Tenderness to palpation with no overlying skin abnormalities  Abdominal:      General: Bowel sounds are normal. There is no abdominal bruit. Palpations: Abdomen is soft. Tenderness: There is no abdominal tenderness. Musculoskeletal:         General: Normal range of motion. Right lower leg: No tenderness. No edema. Left lower leg: No tenderness. No edema. Skin:     General: Skin is warm and dry. Findings: No ecchymosis or erythema. Neurological:      General: No focal deficit present. Mental Status: She is alert and oriented to person, place, and time. Psychiatric:         Mood and Affect: Mood is anxious.          Vital Signs  ED Triage Vitals   Temperature Pulse Respirations Blood Pressure SpO2   08/20/23 1801 08/20/23 1801 08/20/23 1801 08/20/23 1801 08/20/23 1801   97.8 °F (36.6 °C) 77 (!) 32 151/94 94 %      Temp Source Heart Rate Source Patient Position - Orthostatic VS BP Location FiO2 (%)   08/20/23 1801 08/20/23 1815 08/20/23 1801 08/20/23 1801 --   Temporal Monitor Lying Right arm       Pain Score       08/20/23 1801       10 - Worst Possible Pain           Vitals:    08/20/23 1830 08/20/23 2000 08/20/23 2025 08/20/23 2030   BP: 131/74 97/64 104/62 106/65   Pulse: 64 72 67 72   Patient Position - Orthostatic VS: Lying Lying Lying Lying         Visual Acuity      ED Medications  Medications   lidocaine (LIDODERM) 5 % patch 1 patch (1 patch Topical Medication Applied 8/20/23 1815)   morphine injection 4 mg (4 mg Intravenous Given 8/20/23 1815)   morphine injection 4 mg (4 mg Intravenous Given 8/20/23 2025)       Diagnostic Studies  Results Reviewed     Procedure Component Value Units Date/Time    HS Troponin I 2hr [[id number]]  (Normal) Collected: 08/20/23 2022    Lab Status: Final result Specimen: Blood from Arm, Left Updated: 08/20/23 2048     hs TnI 2hr 3 ng/L      Delta 2hr hsTnI 0 ng/L     HS Troponin I 4hr [[id number]]     Lab Status: No result Specimen: Blood     B-Type Natriuretic Peptide(BNP) [[id number]]  (Normal) Collected: 08/20/23 1814    Lab Status: Final result Specimen: Blood from Arm, Left Updated: 08/20/23 1845     BNP 52 pg/mL     HS Troponin 0hr (reflex protocol) [[id number]]  (Normal) Collected: 08/20/23 1814    Lab Status: Final result Specimen: Blood from Arm, Left Updated: 08/20/23 1842     hs TnI 0hr 3 ng/L     Comprehensive metabolic panel [[id number]]  (Abnormal) Collected: 08/20/23 1814    Lab Status: Final result Specimen: Blood from Arm, Left Updated: 08/20/23 1838     Sodium 142 mmol/L      Potassium 4.4 mmol/L      Chloride 111 mmol/L      CO2 22 mmol/L      ANION GAP 9 mmol/L      BUN 35 mg/dL      Creatinine 1.73 mg/dL      Glucose 90 mg/dL      Calcium 9.1 mg/dL      AST 23 U/L      ALT 33 U/L      Alkaline Phosphatase 59 U/L      Total Protein 6.8 g/dL      Albumin 4.5 g/dL      Total Bilirubin 0.41 mg/dL      eGFR 31 ml/min/1.73sq m     Narrative:      [facility] guidelines for Chronic Kidney Disease (CKD):   •  Stage 1 with normal or high GFR (GFR > 90 mL/min/1.73 square meters)  •  Stage 2 Mild CKD (GFR = 60-89 mL/min/1.73 square meters)  •  Stage 3A Moderate CKD (GFR = 45-59 mL/min/1.73 square meters)  •  Stage 3B Moderate CKD (GFR = 30-44 mL/min/1.73 square meters)  •  Stage 4 Severe CKD (GFR = 15-29 mL/min/1.73 square meters)  •  Stage 5 End Stage CKD (GFR <15 mL/min/1.73 square meters)  Note: GFR calculation is accurate only with a steady state creatinine    Protime-INR [[id number]]  (Normal) Collected: 08/20/23 1814    Lab Status: Final result Specimen: Blood from Arm, Left Updated: 08/20/23 1830     Protime 12.8 seconds      INR 0.93    APTT [[id number]]  (Normal) Collected: 08/20/23 1814    Lab Status: Final result Specimen: Blood from Arm, Left Updated: 08/20/23 1830     PTT 30 seconds     CBC and differential [[id number]]  (Abnormal) Collected: 08/20/23 1814    Lab Status: Final result Specimen: Blood from Arm, Left Updated: 08/20/23 1820     WBC 6.21 Thousand/uL      RBC 3.81 Million/uL      Hemoglobin 12.4 g/dL      Hematocrit 38.0 %       fL      MCH 32.5 pg      MCHC 32.6 g/dL      RDW 13.0 %      MPV 10.7 fL      Platelets 032 Thousands/uL      nRBC 0 /100 WBCs      Neutrophils Relative 55 %      Immat GRANS % 0 %      Lymphocytes Relative 33 %      Monocytes Relative 9 %      Eosinophils Relative 2 %      Basophils Relative 1 %      Neutrophils Absolute 3.44 Thousands/µL      Immature Grans Absolute 0.02 Thousand/uL      Lymphocytes Absolute 2.06 Thousands/µL      Monocytes Absolute 0.54 Thousand/µL      Eosinophils Absolute 0.11 Thousand/µL      Basophils Absolute 0.04 Thousands/µL                  CT chest without contrast   Final Result by Radha Beckman MD (08/20 2042)      No evidence of acute intrathoracic pathology.       Stable upper lobe predominant micronodularity which may reflect respiratory bronchiolitis given patient's smoking history               Workstation performed: [id number]                    Procedures  ECG 12 Lead Documentation Only    Date/Time: 8/20/2023 6:15 PM    Performed by: Mohini Claudio PA-C  Authorized by: Mohini Claudio PA-C    Indications / Diagnosis:  Chest pain  Patient location:  ED  Interpretation:     Interpretation: non-specific    Rate:     ECG rate:  79    ECG rate assessment: normal    Rhythm:     Rhythm: sinus rhythm    Ectopy:     Ectopy: none    QRS:     QRS axis:  Normal    QRS intervals:  Normal  Conduction:     Conduction: normal    ST segments:     ST segments:  Normal  T waves:     T waves: normal               ED Course  ED Course as of 08/20/23 2049   Sun Aug 20, 2023   1842 WBC: 6.21 1847 hs TnI 0hr: 3   1847 Creatinine(!): 1.73  Elevated from baseline. Will have patient follow up with PCP for recheck    1937 Patient has had improvement of symptoms with morphine and lidocaine patch. CT pending   2047 CT: No evidence of acute intrathoracic pathology.     Stable upper lobe predominant micronodularity which may reflect respiratory bronchiolitis given patient's smoking history      2047 Discussed all results and findings with the patient and family. Discussed symptomatic care return precautions and appropriate follow-up             HEART Risk Score    Flowsheet Row Most Recent Value   Heart Score Risk Calculator    History 0 Filed at: 08/20/2023 1843   ECG 0 Filed at: 08/20/2023 1843   Age 1 Filed at: 08/20/2023 1843   Risk Factors 1 Filed at: 08/20/2023 1843   Troponin 0 Filed at: 08/20/2023 1843   HEART Score 2 Filed at: 08/20/2023 1843                        SBIRT 20yo+    Flowsheet Row Most Recent Value   Initial Alcohol Screen: US AUDIT-C     1. How often do you have a drink containing alcohol? 3 Filed at: 08/20/2023 1810   2. How many drinks containing alcohol do you have on a typical day you are drinking? 1 Filed at: 08/20/2023 1810   3b. FEMALE Any Age, or MALE 65+: How often do you have 4 or more drinks on one occassion? 0 Filed at: 08/20/2023 1810   Audit-C Score 4 Filed at: 08/20/2023 1810   ANDREINA: How many times in the past year have you. .. Used an illegal drug or used a prescription medication for non-medical reasons? Never Filed at: 08/20/2023 1810                    Medical Decision Making  74-year-old female presented to the emergency department for evaluation of left-sided chest wall tenderness status post pulling out a corn stock on Friday. Vitals and medical record reviewed. Reproducible left-sided tenderness. Normal and equal breath sounds.   Patient at risk for muscular strain, rib fracture, pneumothorax, MI.  EKG nonischemic, troponin within normal limits, low concern for MI. Chest x-ray negative for pneumothorax or rib fracture. Will treat at muscular injury. Will have patient follow up with PCP. Discussed the importance of deep inspiration patient verbalized understanding. She was clinically and hemodynamically stable for discharge    Elevated creatine kinase: acute illness or injury  Muscle strain: acute illness or injury  Amount and/or Complexity of Data Reviewed  Labs: ordered. Decision-making details documented in ED Course. Radiology: ordered. Risk  Prescription drug management. Disposition  Final diagnoses:   Muscle strain   Elevated creatine kinase     Time reflects when diagnosis was documented in both MDM as applicable and the Disposition within this note     Time User Action Codes Description Comment    8/20/2023  8:18 PM José Miguel Mail. 8XXA] Muscle strain     8/20/2023  8:45 PM Miladis Kapadia Add [R79.89] Elevated serum creatinine     8/20/2023  8:45 PM Miladis Kapadia Remove [R79.89] Elevated serum creatinine     8/20/2023  8:45 PM Miladis Kapadia Add [R74.8] Elevated creatine kinase       ED Disposition     ED Disposition   Discharge    Condition   Stable    Date/Time   Sun Aug 20, 2023  8:18 PM    Comment   Tank Mckinley discharge to home/self care.                Follow-up Information     Follow up With Specialties Details Why Janneth Olivo MD Family Medicine In 3 days recheck of the kidney function [address]  [phone]            Patient's Medications   Discharge Prescriptions    LIDOCAINE (LIDODERM) 5 %    Apply 1 patch topically over 12 hours daily Remove & Discard patch within 12 hours or as directed by MD       Start Date: 8/20/2023 End Date: --       Order Dose: 1 patch       Quantity: 6 patch    Refills: 0    NAPROXEN (NAPROSYN) 500 MG TABLET    Take 1 tablet (500 mg total) by mouth 2 (two) times a day with meals for 5 days       Start Date: 8/20/2023 End Date: 8/25/2023 Order Dose: 500 mg       Quantity: 10 tablet    Refills: 0       No discharge procedures on file.     PDMP Review     None          ED Provider  Electronically Signed by           Maddie Castro PA-C  08/20/23 3126

## 2023-08-21 LAB
ATRIAL RATE: 79 BPM
P AXIS: 44 DEGREES
PR INTERVAL: 136 MS
QRS AXIS: 74 DEGREES
QRSD INTERVAL: 78 MS
QT INTERVAL: 386 MS
QTC INTERVAL: 442 MS
T WAVE AXIS: 74 DEGREES
VENTRICULAR RATE: 79 BPM

## 2023-08-21 NOTE — DISCHARGE INSTRUCTIONS
Please continue to take deep breaths to help avoid developing a pneumonia. Lidocaine patches have been sent to your pharmacy. Alternate between Tylenol and ibuprofen as needed. Please follow-up with your family doctor and return with any new or worsening symptoms  Your kidney function was elevated today and this should be rechecked by your PCP. Please stay will hydrated in the mean time.

## 2024-01-31 ENCOUNTER — HOSPITAL ENCOUNTER (OUTPATIENT)
Dept: NON INVASIVE DIAGNOSTICS | Facility: HOSPITAL | Age: 64
Discharge: HOME | End: 2024-01-31
Payer: COMMERCIAL

## 2024-01-31 VITALS — WEIGHT: 175 LBS | HEIGHT: 68 IN | BODY MASS INDEX: 26.52 KG/M2

## 2024-01-31 DIAGNOSIS — R06.02 SHORTNESS OF BREATH: ICD-10-CM

## 2024-01-31 DIAGNOSIS — F17.200 TOBACCO USE DISORDER: ICD-10-CM

## 2024-01-31 DIAGNOSIS — J43.8 OTHER EMPHYSEMA (HCC): ICD-10-CM

## 2024-01-31 DIAGNOSIS — I10 HYPERTENSION GOAL BP (BLOOD PRESSURE) < 140/90: ICD-10-CM

## 2024-01-31 DIAGNOSIS — J44.9 COPD, GROUP B, BY GOLD 2017 CLASSIFICATION (HCC): ICD-10-CM

## 2024-01-31 DIAGNOSIS — I25.10 ATHEROSCLEROSIS OF NATIVE CORONARY ARTERY WITHOUT ANGINA PECTORIS, UNSPECIFIED WHETHER NATIVE OR TRANSPLANTED HEART: ICD-10-CM

## 2024-01-31 LAB
MAX HR PERCENT: 90 %
MAX HR: 144 BPM
RATE PRESSURE PRODUCT: NORMAL
SL CV LV EF: 65
SL CV STRESS RECOVERY BP: NORMAL MMHG
SL CV STRESS RECOVERY HR: 98 BPM
SL CV STRESS RECOVERY O2 SAT: 97 %
STRESS ANGINA INDEX: 1
STRESS BASELINE BP: NORMAL MMHG
STRESS BASELINE HR: 64 BPM
STRESS O2 SAT REST: 99 %
STRESS PEAK HR: 144 BPM
STRESS POST EXERCISE DUR MIN: 6 MIN
STRESS POST EXERCISE DUR SEC: 44 SEC
STRESS POST O2 SAT PEAK: 97 %
STRESS POST PEAK BP: 144 MMHG

## 2024-01-31 PROCEDURE — 93350 STRESS TTE ONLY: CPT

## 2024-01-31 PROCEDURE — 93350 STRESS TTE ONLY: CPT | Performed by: INTERNAL MEDICINE

## 2024-01-31 RX ORDER — FLUTICASONE FUROATE, UMECLIDINIUM BROMIDE AND VILANTEROL TRIFENATATE 100; 62.5; 25 UG/1; UG/1; UG/1
POWDER RESPIRATORY (INHALATION)
COMMUNITY
Start: 2024-01-23

## 2024-01-31 RX ORDER — ATENOLOL 50 MG/1
50 TABLET ORAL DAILY
COMMUNITY

## 2024-01-31 RX ORDER — DOCUSATE SODIUM 100 MG/1
CAPSULE, LIQUID FILLED ORAL
COMMUNITY
Start: 2023-12-04

## 2024-01-31 RX ORDER — LOSARTAN POTASSIUM 100 MG/1
TABLET ORAL
COMMUNITY
Start: 2024-01-02

## 2024-01-31 RX ORDER — ROSUVASTATIN CALCIUM 20 MG/1
20 TABLET, COATED ORAL EVERY MORNING
COMMUNITY
Start: 2024-01-23

## 2024-01-31 RX ORDER — GABAPENTIN 100 MG/1
CAPSULE ORAL
COMMUNITY
Start: 2024-01-23

## 2024-01-31 RX ORDER — DOBUTAMINE HYDROCHLORIDE 200 MG/100ML
5-50 INJECTION INTRAVENOUS CONTINUOUS
Status: DISCONTINUED | OUTPATIENT
Start: 2024-01-31 | End: 2024-02-01 | Stop reason: HOSPADM

## 2024-01-31 RX ORDER — ALBUTEROL SULFATE 90 UG/1
AEROSOL, METERED RESPIRATORY (INHALATION)
COMMUNITY

## 2024-01-31 RX ORDER — ASPIRIN 81 MG/1
81 TABLET ORAL DAILY
COMMUNITY

## 2024-01-31 RX ORDER — OMEPRAZOLE 20 MG/1
CAPSULE, DELAYED RELEASE ORAL
COMMUNITY
Start: 2024-01-02

## 2024-01-31 RX ORDER — HYDROCHLOROTHIAZIDE 25 MG/1
TABLET ORAL
COMMUNITY
Start: 2023-11-01

## 2024-01-31 RX ADMIN — DOBUTAMINE HYDROCHLORIDE 10 MCG/KG/MIN: 200 INJECTION INTRAVENOUS at 11:24

## 2024-02-02 LAB
CHEST PAIN STATEMENT: NORMAL
MAX DIASTOLIC BP: 86 MMHG
MAX PREDICTED HEART RATE: 159 BPM
PROTOCOL NAME: NORMAL
REASON FOR TERMINATION: NORMAL
STRESS POST EXERCISE DUR MIN: 6 MIN
STRESS POST EXERCISE DUR SEC: 44 SEC
STRESS POST PEAK HR: 144 BPM
STRESS POST PEAK SYSTOLIC BP: 158 MMHG
TARGET HR FORMULA: NORMAL
TEST INDICATION: NORMAL

## 2024-02-09 ENCOUNTER — HOSPITAL ENCOUNTER (OUTPATIENT)
Dept: RADIOLOGY | Facility: CLINIC | Age: 64
End: 2024-02-09
Payer: COMMERCIAL

## 2024-02-09 VITALS — HEIGHT: 67 IN | BODY MASS INDEX: 29.82 KG/M2 | WEIGHT: 190 LBS

## 2024-02-09 DIAGNOSIS — Z12.31 ENCOUNTER FOR SCREENING MAMMOGRAM FOR MALIGNANT NEOPLASM OF BREAST: ICD-10-CM

## 2024-02-09 PROCEDURE — 77067 SCR MAMMO BI INCL CAD: CPT

## 2024-02-09 PROCEDURE — 77063 BREAST TOMOSYNTHESIS BI: CPT

## 2024-02-19 ENCOUNTER — HOSPITAL ENCOUNTER (OUTPATIENT)
Dept: RADIOLOGY | Facility: CLINIC | Age: 64
Discharge: HOME | End: 2024-02-19
Payer: COMMERCIAL

## 2024-02-19 DIAGNOSIS — R92.8 ABNORMAL SCREENING MAMMOGRAM: ICD-10-CM

## 2024-02-19 PROCEDURE — 76642 ULTRASOUND BREAST LIMITED: CPT

## 2024-02-26 ENCOUNTER — HOSPITAL ENCOUNTER (EMERGENCY)
Facility: HOSPITAL | Age: 64
Discharge: HOME | End: 2024-02-26
Attending: EMERGENCY MEDICINE | Admitting: EMERGENCY MEDICINE
Payer: COMMERCIAL

## 2024-02-26 ENCOUNTER — APPOINTMENT (EMERGENCY)
Dept: RADIOLOGY | Facility: HOSPITAL | Age: 64
End: 2024-02-26
Payer: COMMERCIAL

## 2024-02-26 VITALS
TEMPERATURE: 97.9 F | DIASTOLIC BLOOD PRESSURE: 90 MMHG | SYSTOLIC BLOOD PRESSURE: 144 MMHG | HEART RATE: 59 BPM | OXYGEN SATURATION: 100 % | RESPIRATION RATE: 20 BRPM

## 2024-02-26 DIAGNOSIS — R07.9 CHEST PAIN: ICD-10-CM

## 2024-02-26 DIAGNOSIS — M25.512 ACUTE PAIN OF LEFT SHOULDER: Primary | ICD-10-CM

## 2024-02-26 LAB
ALBUMIN SERPL BCP-MCNC: 4.3 G/DL (ref 3.5–5)
ALP SERPL-CCNC: 59 U/L (ref 34–104)
ALT SERPL W P-5'-P-CCNC: 59 U/L (ref 7–52)
ANION GAP SERPL CALCULATED.3IONS-SCNC: 5 MMOL/L
AST SERPL W P-5'-P-CCNC: 37 U/L (ref 13–39)
ATRIAL RATE: 54 BPM
BASOPHILS # BLD AUTO: 0.04 THOUSANDS/ÂΜL (ref 0–0.1)
BASOPHILS NFR BLD AUTO: 1 % (ref 0–1)
BILIRUB SERPL-MCNC: 0.36 MG/DL (ref 0.2–1)
BUN SERPL-MCNC: 36 MG/DL (ref 5–25)
CALCIUM SERPL-MCNC: 9.3 MG/DL (ref 8.4–10.2)
CARDIAC TROPONIN I PNL SERPL HS: 4 NG/L
CHLORIDE SERPL-SCNC: 111 MMOL/L (ref 96–108)
CO2 SERPL-SCNC: 23 MMOL/L (ref 21–32)
CREAT SERPL-MCNC: 1.44 MG/DL (ref 0.6–1.3)
D DIMER PPP FEU-MCNC: 0.29 UG/ML FEU
EOSINOPHIL # BLD AUTO: 0.09 THOUSAND/ÂΜL (ref 0–0.61)
EOSINOPHIL NFR BLD AUTO: 2 % (ref 0–6)
ERYTHROCYTE [DISTWIDTH] IN BLOOD BY AUTOMATED COUNT: 12.6 % (ref 11.6–15.1)
GFR SERPL CREATININE-BSD FRML MDRD: 39 ML/MIN/1.73SQ M
GLUCOSE SERPL-MCNC: 117 MG/DL (ref 65–140)
HCT VFR BLD AUTO: 35.9 % (ref 34.8–46.1)
HGB BLD-MCNC: 11.6 G/DL (ref 11.5–15.4)
IMM GRANULOCYTES # BLD AUTO: 0.01 THOUSAND/UL (ref 0–0.2)
IMM GRANULOCYTES NFR BLD AUTO: 0 % (ref 0–2)
LYMPHOCYTES # BLD AUTO: 2.09 THOUSANDS/ÂΜL (ref 0.6–4.47)
LYMPHOCYTES NFR BLD AUTO: 39 % (ref 14–44)
MCH RBC QN AUTO: 31.5 PG (ref 26.8–34.3)
MCHC RBC AUTO-ENTMCNC: 32.3 G/DL (ref 31.4–37.4)
MCV RBC AUTO: 98 FL (ref 82–98)
MONOCYTES # BLD AUTO: 0.45 THOUSAND/ÂΜL (ref 0.17–1.22)
MONOCYTES NFR BLD AUTO: 8 % (ref 4–12)
NEUTROPHILS # BLD AUTO: 2.71 THOUSANDS/ÂΜL (ref 1.85–7.62)
NEUTS SEG NFR BLD AUTO: 50 % (ref 43–75)
NRBC BLD AUTO-RTO: 0 /100 WBCS
P AXIS: 64 DEGREES
PLATELET # BLD AUTO: 198 THOUSANDS/UL (ref 149–390)
PMV BLD AUTO: 10.7 FL (ref 8.9–12.7)
POTASSIUM SERPL-SCNC: 4.4 MMOL/L (ref 3.5–5.3)
PR INTERVAL: 152 MS
PROT SERPL-MCNC: 6.6 G/DL (ref 6.4–8.4)
QRS AXIS: 83 DEGREES
QRSD INTERVAL: 88 MS
QT INTERVAL: 414 MS
QTC INTERVAL: 392 MS
RBC # BLD AUTO: 3.68 MILLION/UL (ref 3.81–5.12)
SODIUM SERPL-SCNC: 139 MMOL/L (ref 135–147)
T WAVE AXIS: 70 DEGREES
VENTRICULAR RATE: 54 BPM
WBC # BLD AUTO: 5.39 THOUSAND/UL (ref 4.31–10.16)

## 2024-02-26 PROCEDURE — 71045 X-RAY EXAM CHEST 1 VIEW: CPT

## 2024-02-26 PROCEDURE — 99285 EMERGENCY DEPT VISIT HI MDM: CPT | Performed by: EMERGENCY MEDICINE

## 2024-02-26 PROCEDURE — 93005 ELECTROCARDIOGRAM TRACING: CPT

## 2024-02-26 PROCEDURE — 93010 ELECTROCARDIOGRAM REPORT: CPT | Performed by: INTERNAL MEDICINE

## 2024-02-26 PROCEDURE — 84484 ASSAY OF TROPONIN QUANT: CPT | Performed by: EMERGENCY MEDICINE

## 2024-02-26 PROCEDURE — 80053 COMPREHEN METABOLIC PANEL: CPT | Performed by: EMERGENCY MEDICINE

## 2024-02-26 PROCEDURE — 85025 COMPLETE CBC W/AUTO DIFF WBC: CPT | Performed by: EMERGENCY MEDICINE

## 2024-02-26 PROCEDURE — 85379 FIBRIN DEGRADATION QUANT: CPT | Performed by: EMERGENCY MEDICINE

## 2024-02-26 PROCEDURE — 36415 COLL VENOUS BLD VENIPUNCTURE: CPT

## 2024-02-26 PROCEDURE — 99285 EMERGENCY DEPT VISIT HI MDM: CPT

## 2024-02-26 PROCEDURE — 96374 THER/PROPH/DIAG INJ IV PUSH: CPT

## 2024-02-26 RX ORDER — MORPHINE SULFATE 4 MG/ML
4 INJECTION, SOLUTION INTRAMUSCULAR; INTRAVENOUS ONCE
Status: COMPLETED | OUTPATIENT
Start: 2024-02-26 | End: 2024-02-26

## 2024-02-26 RX ADMIN — MORPHINE SULFATE 4 MG: 4 INJECTION INTRAVENOUS at 14:22

## 2024-02-26 NOTE — Clinical Note
Yakelin Earl was seen and treated in our emergency department on 2/26/2024.                Diagnosis:     aYkelin  may return to work on return date.    She may return on this date: 02/27/2024         If you have any questions or concerns, please don't hesitate to call.      Ilan Moon, DO    ______________________________           _______________          _______________  Hospital Representative                              Date                                Time

## 2024-02-26 NOTE — DISCHARGE INSTRUCTIONS
Return immediately if worse or any new symptoms  Follow up with your primary clinician this week or cardiologist   Yes

## 2024-02-26 NOTE — ED NOTES
Patient stating she does not want to stay for repeat troponin. Provider made aware     Mariela Finley RN  02/26/24 [id number]

## 2024-02-26 NOTE — ED NOTES
Patient asking how much longer of a wait time & stating that she does not want to wait.     Mariela Finley RN  02/26/24 [id number]

## 2024-02-26 NOTE — ED PROVIDER NOTES
History  Chief Complaint   Patient presents with    Shoulder Pain     Patient c/o pain in left shoulder/chest that radiates down her left arm. Patient denies any known injury. Patient     Chest Pain     63-year-old female accompanied by friend describes acute onset left interscapular pain that was sharp and severe, occurred at rest, radiated to left upper chest and proximal left upper extremity with bilateral hand clamminess, markedly improved, still has moderate discomfort.  No hemoptysis.  No dyspnea.  Smoking without difficulty.  Medications include aspirin.  Recently informed about a coronary calcification and had a stress test, states it was normal. No viral prodrome. No abdominal pain. 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 Taking?   Trelegy Ellipta 100-62.5-25 MCG/ACT inhaler   Yes No   Sig: INHALE 1 PUFF BY MOUTH ONCE DAILY IN THE MORNING   albuterol (PROVENTIL HFA,VENTOLIN HFA) 90 mcg/act inhaler   Yes No   Sig: USE (2) PUFFS FOUR TIMES DAILY.   aspirin (ECOTRIN LOW STRENGTH) 81 mg EC tablet   Yes No   Sig: Take 81 mg by mouth daily   atenolol (TENORMIN) 50 mg tablet   Yes No   Sig: Take 50 mg by mouth daily   docusate sodium (COLACE) 100 mg capsule   Yes No   gabapentin (NEURONTIN) 100 mg capsule   Yes No   Sig: TAKE ONE CAPSULE BY MOUTH TWICE DAILY IN THE MORNING AND AT BEDTIME   hydroCHLOROthiazide 25 mg tablet   Yes No   losartan (COZAAR)  100 MG tablet   Yes No   Sig: TAKE (1) TABLET BY MOUTH DAILY IN THE MORNING.   naproxen (Naprosyn) 500 mg tablet   No No   Sig: Take 1 tablet (500 mg total) by mouth 2 (two) times a day with meals for 5 days   omeprazole (PriLOSEC) 20 mg delayed release capsule   Yes No   Sig: TAKE (1) CAPSULE BY MOUTH ONCE DAILY 1 HOUR BEFORE THE FIRST MEAL OF THE DAY.   rosuvastatin (CRESTOR) 20 MG tablet   Yes No   Sig: Take 20 mg by mouth every morning      Facility-Administered Medications: None       Past Medical History:   Diagnosis Date    Breast cyst        Past Surgical History:   Procedure Laterality Date    HYSTERECTOMY      age 48    OOPHORECTOMY Bilateral     age 48       Family History   Problem Relation Age of Onset    Lung cancer Mother 74    Leukemia Mother 70    No Known Problems Father     No Known Problems Sister     No Known Problems Sister     Cancer Maternal Grandmother     No Known Problems Maternal Grandfather     No Known Problems Paternal Grandmother     No Known Problems Paternal Grandfather     No Known Problems Maternal Aunt     No Known Problems Maternal Aunt     No Known Problems Maternal Aunt     No Known Problems Maternal Aunt      I have reviewed and agree with the history as documented.    E-Cigarette/Vaping    E-Cigarette Use Never User      E-Cigarette/Vaping Substances     Social History     Tobacco Use    Smoking status: Every Day     Current packs/day: 1.50     Types: Cigarettes    Smokeless tobacco: Never   Vaping Use    Vaping status: Never Used   Substance Use Topics    Alcohol use: Yes     Comment: social    Drug use: Never       Review of Systems   Constitutional:  Negative for fever.   Cardiovascular:  Positive for chest pain.   All other systems reviewed and are negative.      Physical Exam  Physical Exam  Vitals and nursing note reviewed.   Constitutional:       Comments: Pleasant, comfortable-appearing   HENT:      Head: Normocephalic and atraumatic.      Mouth/Throat:      Mouth:  Mucous membranes are moist.      Pharynx: Oropharynx is clear.   Eyes:      Conjunctiva/sclera: Conjunctivae normal.      Pupils: Pupils are equal, round, and reactive to light.   Cardiovascular:      Rate and Rhythm: Normal rate and regular rhythm.      Pulses:           Radial pulses are 2+ on the right side and 2+ on the left side.      Heart sounds: Normal heart sounds. No murmur heard.  Pulmonary:      Effort: Pulmonary effort is normal. No tachypnea, accessory muscle usage or respiratory distress.      Breath sounds: No stridor. Decreased breath sounds present. No wheezing, rhonchi or rales.   Chest:      Comments: Generalized left shoulder, interscapular and upper chest discomfort with left upper extremity ranging  Abdominal:      General: Bowel sounds are normal. There is no distension.      Palpations: Abdomen is soft.      Tenderness: There is no abdominal tenderness.   Musculoskeletal:         General: No deformity.      Cervical back: Neck supple.      Right lower leg: No edema.      Left lower leg: No edema.   Skin:     General: Skin is warm and dry.   Neurological:      General: No focal deficit present.      Mental Status: She is alert and oriented to person, place, and time.      Cranial Nerves: No cranial nerve deficit.      Coordination: Coordination normal.   Psychiatric:         Mood and Affect: Mood normal.         Behavior: Behavior normal.         Thought Content: Thought content normal.         Judgment: Judgment normal.         Vital Signs  ED Triage Vitals   Temperature Pulse Respirations Blood Pressure SpO2   02/26/24 1258 02/26/24 1258 02/26/24 1258 02/26/24 1258 02/26/24 1258   97.9 °F (36.6 °C) 59 20 144/90 100 %      Temp Source Heart Rate Source Patient Position - Orthostatic VS BP Location FiO2 (%)   02/26/24 1258 02/26/24 1258 02/26/24 1258 02/26/24 1258 --   Temporal Monitor Lying Right arm       Pain Score       02/26/24 1422       10 - Worst Possible Pain           Vitals:     02/26/24 1258   BP: 144/90   Pulse: 59   Patient Position - Orthostatic VS: Lying         Visual Acuity      ED Medications  Medications   morphine injection 4 mg (4 mg Intravenous Given 2/26/24 1422)       Diagnostic Studies  Results Reviewed       Procedure Component Value Units Date/Time    D-dimer, quantitative [[id number]]  (Normal) Collected: 02/26/24 1345    Lab Status: Final result Specimen: Blood from Arm, Left Updated: 02/26/24 1403     D-Dimer, Quant 0.29 ug/ml FEU     Narrative:      In the evaluation for possible pulmonary embolism, in the appropriate (Well's Score of 4 or less) patient, the age adjusted d-dimer cutoff for this patient can be calculated as:    Age x 0.01 (in ug/mL) for Age-adjusted D-dimer exclusion threshold for a patient over 50 years.    HS Troponin 0hr (reflex protocol) [[id number]]  (Normal) Collected: 02/26/24 1305    Lab Status: Final result Specimen: Blood from Arm, Left Updated: 02/26/24 1338     hs TnI 0hr 4 ng/L     HS Troponin I 2hr [[id number]]     Lab Status: No result Specimen: Blood     Comprehensive metabolic panel [[id number]]  (Abnormal) Collected: 02/26/24 1305    Lab Status: Final result Specimen: Blood from Arm, Left Updated: 02/26/24 1330     Sodium 139 mmol/L      Potassium 4.4 mmol/L      Chloride 111 mmol/L      CO2 23 mmol/L      ANION GAP 5 mmol/L      BUN 36 mg/dL      Creatinine 1.44 mg/dL      Glucose 117 mg/dL      Calcium 9.3 mg/dL      AST 37 U/L      ALT 59 U/L      Alkaline Phosphatase 59 U/L      Total Protein 6.6 g/dL      Albumin 4.3 g/dL      Total Bilirubin 0.36 mg/dL      eGFR 39 ml/min/1.73sq m     Narrative:      National Kidney Disease Foundation guidelines for Chronic Kidney Disease (CKD):     Stage 1 with normal or high GFR (GFR > 90 mL/min/1.73 square meters)    Stage 2 Mild CKD (GFR = 60-89 mL/min/1.73 square meters)    Stage 3A Moderate CKD (GFR = 45-59 mL/min/1.73 square meters)    Stage 3B Moderate CKD (GFR = 30-44 mL/min/1.73 square  meters)    Stage 4 Severe CKD (GFR = 15-29 mL/min/1.73 square meters)    Stage 5 End Stage CKD (GFR <15 mL/min/1.73 square meters)  Note: GFR calculation is accurate only with a steady state creatinine    CBC and differential [[id number]]  (Abnormal) Collected: 02/26/24 1305    Lab Status: Final result Specimen: Blood from Arm, Left Updated: 02/26/24 1309     WBC 5.39 Thousand/uL      RBC 3.68 Million/uL      Hemoglobin 11.6 g/dL      Hematocrit 35.9 %      MCV 98 fL      MCH 31.5 pg      MCHC 32.3 g/dL      RDW 12.6 %      MPV 10.7 fL      Platelets 198 Thousands/uL      nRBC 0 /100 WBCs      Neutrophils Relative 50 %      Immat GRANS % 0 %      Lymphocytes Relative 39 %      Monocytes Relative 8 %      Eosinophils Relative 2 %      Basophils Relative 1 %      Neutrophils Absolute 2.71 Thousands/µL      Immature Grans Absolute 0.01 Thousand/uL      Lymphocytes Absolute 2.09 Thousands/µL      Monocytes Absolute 0.45 Thousand/µL      Eosinophils Absolute 0.09 Thousand/µL      Basophils Absolute 0.04 Thousands/µL                    XR chest 1 view portable    (Results Pending)              Procedures  Procedures         ED Course  ED Course as of 02/26/24 1436   Mon Feb 26, 2024   1334 WBC: 5.39  EKG 1305 p.m. sinus bradycardia rate 54 normal axis normal intervals T wave inversion V1 V2 aVL no ST elevation or depression interpreted by me   1422 D-Dimer, Quant: 0.29   1426 hs TnI 0hr: 4   1430 Comfort improved and with ROM, likely musculo skeletal, discussed results, reviewed echo stress and requests discharge home, does not want second troponin, voices understanding of utility and agrees to return if worse or any new symptoms                               SBIRT 22yo+      Flowsheet Row Most Recent Value   Initial Alcohol Screen: US AUDIT-C     1. How often do you have a drink containing alcohol? 0 Filed at: 02/26/2024 1310   2. How many drinks containing alcohol do you have on a typical day you are drinking?  0 Filed  at: 02/26/2024 1310   3a. Male UNDER 65: How often do you have five or more drinks on one occasion? 0 Filed at: 02/26/2024 1310   3b. FEMALE Any Age, or MALE 65+: How often do you have 4 or more drinks on one occassion? 0 Filed at: 02/26/2024 1310   Audit-C Score 0 Filed at: 02/26/2024 1310   ANDREINA: How many times in the past year have you...    Used an illegal drug or used a prescription medication for non-medical reasons? Never Filed at: 02/26/2024 1310                      Medical Decision Making  Amount and/or Complexity of Data Reviewed  Labs: ordered. Decision-making details documented in ED Course.  Radiology: ordered and independent interpretation performed. Decision-making details documented in ED Course.  ECG/medicine tests: ordered and independent interpretation performed. Decision-making details documented in ED Course.    Risk  Prescription drug management.             Disposition  Final diagnoses:   Acute pain of left shoulder   Chest pain     Time reflects when diagnosis was documented in both MDM as applicable and the Disposition within this note       Time User Action Codes Description Comment    2/26/2024  2:32 PM Ilan Moon Add [M25.512] Acute pain of left shoulder     2/26/2024  2:32 PM Ilan Moon Add [R07.9] Chest pain           ED Disposition       ED Disposition   Discharge    Condition   Stable    Date/Time   Mon Feb 26, 2024 1432    Comment   Yakelin Earl discharge to home/self care.                   Follow-up Information       Follow up With Specialties Details Why Contact Info    Syed Gore MD Family Medicine Schedule an appointment as soon as possible for a visit in 1 week  [address] [facility] [address]  [phone]              Patient's Medications   Discharge Prescriptions    No medications on file       No discharge procedures on file.    PDMP Review       None            ED Provider  Electronically Signed by             Ilan Moon DO  02/26/24  1435       Ilan Moon,   02/26/24 1437

## 2024-05-13 ENCOUNTER — HOSPITAL ENCOUNTER (OUTPATIENT)
Dept: RADIOLOGY | Facility: HOSPITAL | Age: 64
Discharge: HOME | End: 2024-05-13
Payer: COMMERCIAL

## 2024-05-13 DIAGNOSIS — J44.1 COPD EXACERBATION (HCC): ICD-10-CM

## 2024-05-13 PROCEDURE — 71046 X-RAY EXAM CHEST 2 VIEWS: CPT

## 2024-08-21 ENCOUNTER — HOSPITAL ENCOUNTER (OUTPATIENT)
Dept: RADIOLOGY | Facility: CLINIC | Age: 64
Discharge: HOME | End: 2024-08-21
Payer: COMMERCIAL

## 2024-08-21 ENCOUNTER — HOSPITAL ENCOUNTER (OUTPATIENT)
Dept: CT IMAGING | Facility: HOSPITAL | Age: 64
Discharge: HOME | End: 2024-08-21
Payer: COMMERCIAL

## 2024-08-21 DIAGNOSIS — R92.8 OTHER ABNORMAL AND INCONCLUSIVE FINDINGS ON DIAGNOSTIC IMAGING OF BREAST: ICD-10-CM

## 2024-08-21 DIAGNOSIS — F17.210 NICOTINE DEPENDENCE, CIGARETTES, UNCOMPLICATED: ICD-10-CM

## 2024-08-21 PROCEDURE — 76642 ULTRASOUND BREAST LIMITED: CPT

## 2024-08-21 PROCEDURE — 71271 CT THORAX LUNG CANCER SCR C-: CPT

## 2024-10-10 ENCOUNTER — HOSPITAL ENCOUNTER (OUTPATIENT)
Dept: RADIOLOGY | Facility: HOSPITAL | Age: 64
End: 2024-10-10
Payer: COMMERCIAL

## 2024-10-10 DIAGNOSIS — M79.671 RIGHT FOOT PAIN: ICD-10-CM

## 2024-10-10 PROCEDURE — 73630 X-RAY EXAM OF FOOT: CPT

## 2025-01-09 ENCOUNTER — APPOINTMENT (OUTPATIENT)
Dept: RADIOLOGY | Facility: CLINIC | Age: 65
End: 2025-01-09
Payer: COMMERCIAL

## 2025-01-09 ENCOUNTER — OFFICE VISIT (OUTPATIENT)
Dept: URGENT CARE | Facility: CLINIC | Age: 65
End: 2025-01-09
Payer: COMMERCIAL

## 2025-01-09 VITALS
HEART RATE: 58 BPM | HEIGHT: 67 IN | SYSTOLIC BLOOD PRESSURE: 138 MMHG | DIASTOLIC BLOOD PRESSURE: 70 MMHG | BODY MASS INDEX: 29.82 KG/M2 | TEMPERATURE: 96.9 F | OXYGEN SATURATION: 99 % | WEIGHT: 190 LBS | RESPIRATION RATE: 17 BRPM

## 2025-01-09 DIAGNOSIS — M65.272 CALCIFIC TENDINITIS OF LEFT FOOT: Primary | ICD-10-CM

## 2025-01-09 PROCEDURE — 73630 X-RAY EXAM OF FOOT: CPT

## 2025-01-09 PROCEDURE — 99213 OFFICE O/P EST LOW 20 MIN: CPT

## 2025-01-09 RX ORDER — PREDNISONE 20 MG/1
40 TABLET ORAL DAILY
Qty: 10 TABLET | Refills: 0 | Status: SHIPPED | OUTPATIENT
Start: 2025-01-09 | End: 2025-01-14

## 2025-01-09 NOTE — LETTER
January 9, 2025     Patient: Yakelin Earl   YOB: 1960   Date of Visit: 1/9/2025       To Whom it May Concern:    Yakelin Earl was seen in my clinic on 1/9/2025. She may return to work on 01/13/2025 .    If you have any questions or concerns, please don't hesitate to call.         Sincerely,          LUCINDA Kumar        CC: No Recipients

## 2025-01-09 NOTE — PROGRESS NOTES
[facility]        NAME: Yakelin Earl is a 64 y.o. female  : 1960    MRN: [id number]  DATE: 2025  TIME: 10:59 AM    Assessment and Plan   Calcific tendinitis of left foot [M65.272]  1. Calcific tendinitis of left foot  XR foot 3+ vw left    predniSONE 20 mg tablet    Ambulatory Referral to Orthopedic Surgery    Cam Boot    Crutches        Official radiology read: No acute osseous abnormality.   Pt placed in ortho boot and given crutches - pt instructed to follow up with ortho - pt verbalized understanding    Patient Instructions       Official radiology read: No acute osseous abnormality.     Take prednisone as prescribed - take in the morning with food    Tylenol/Ibuprofen for pain  Wear ortho boot for support and use crutches  Ice 20 minutes 3-4 times per day for 3 days  Insulate the skin from the ice to prevent frostbite  Rest and Elevate  Follow up with orthopedic - referral placed today.     Follow up with PCP in 3-5 days.  Proceed to  ER if symptoms worsen.    If tests are performed, our office will contact you with results only if changes need to made to the care plan discussed with you at the visit. You can review your full results on [facility].    Chief Complaint     Chief Complaint   Patient presents with   • left foot pain     Left foot pain and swelling X 6 days. No injury. Developed a sharp pain lateral left foot. Does have a hx of a stress fracture in that foot X 5 years ago         History of Present Illness       64 year old female arrives reporting left foot pain and swelling increasing over past 6 days.  Patient denies any injury to affected area.  Patient reports she has been taking Tylenol and Motrin for the pain but it is not helping.  Patient reports she is on her feet constantly for work as she is a .  Patient denies any numbness or tingling in left lower extremity.  Patient has strong pulses and sensation intact in left lower extremity.   Patient does have tenderness to palpation along dorsal lateral aspect of left foot with associated swelling.  Patient does report a prior history of gout in her great toe but reports this feels completely different.      Review of Systems   Review of Systems   Constitutional: Negative.    HENT: Negative.     Respiratory: Negative.     Cardiovascular: Negative.    Gastrointestinal: Negative.    Musculoskeletal:  Positive for arthralgias (left foot pain) and joint swelling (left foot).   Skin: Negative.          Current Medications       Current Outpatient Medications:   •  aspirin (ECOTRIN LOW STRENGTH) 81 mg EC tablet, Take 81 mg by mouth daily, Disp: , Rfl:   •  atenolol (TENORMIN) 50 mg tablet, Take 50 mg by mouth daily, Disp: , Rfl:   •  docusate sodium (COLACE) 100 mg capsule, , Disp: , Rfl:   •  hydroCHLOROthiazide 25 mg tablet, , Disp: , Rfl:   •  losartan (COZAAR) 100 MG tablet, TAKE (1) TABLET BY MOUTH DAILY IN THE MORNING., Disp: , Rfl:   •  omeprazole (PriLOSEC) 20 mg delayed release capsule, TAKE (1) CAPSULE BY MOUTH ONCE DAILY 1 HOUR BEFORE THE FIRST MEAL OF THE DAY., Disp: , Rfl:   •  predniSONE 20 mg tablet, Take 2 tablets (40 mg total) by mouth daily for 5 days, Disp: 10 tablet, Rfl: 0  •  rosuvastatin (CRESTOR) 20 MG tablet, Take 20 mg by mouth every morning, Disp: , Rfl:   •  albuterol (PROVENTIL HFA,VENTOLIN HFA) 90 mcg/act inhaler, USE (2) PUFFS FOUR TIMES DAILY. (Patient not taking: Reported on 1/9/2025), Disp: , Rfl:   •  gabapentin (NEURONTIN) 100 mg capsule, TAKE ONE CAPSULE BY MOUTH TWICE DAILY IN THE MORNING AND AT BEDTIME, Disp: , Rfl:   •  naproxen (Naprosyn) 500 mg tablet, Take 1 tablet (500 mg total) by mouth 2 (two) times a day with meals for 5 days, Disp: 10 tablet, Rfl: 0  •  Trelegy Ellipta 100-62.5-25 MCG/ACT inhaler, INHALE 1 PUFF BY MOUTH ONCE DAILY IN THE MORNING, Disp: , Rfl:     Current Allergies     Allergies as of 01/09/2025   • (No Known Allergies)            The following  "portions of the patient's history were reviewed and updated as appropriate: allergies, current medications, past family history, past medical history, past social history, past surgical history and problem list.     Past Medical History:   Diagnosis Date   • Breast cyst        Past Surgical History:   Procedure Laterality Date   • HYSTERECTOMY      age 48   • OOPHORECTOMY Bilateral     age 48       Family History   Problem Relation Age of Onset   • Lung cancer Mother 74   • Leukemia Mother 70   • No Known Problems Father    • No Known Problems Sister    • No Known Problems Sister    • Cancer Maternal Grandmother    • No Known Problems Maternal Grandfather    • No Known Problems Paternal Grandmother    • No Known Problems Paternal Grandfather    • No Known Problems Maternal Aunt    • No Known Problems Maternal Aunt    • No Known Problems Maternal Aunt    • No Known Problems Maternal Aunt          Medications have been verified.        Objective   /70   Pulse 58   Temp (!) 96.9 °F (36.1 °C)   Resp 17   Ht 5' 7\" (1.702 m)   Wt 86.2 kg (190 lb)   SpO2 99%   BMI 29.76 kg/m²        Physical Exam     Physical Exam  Vitals and nursing note reviewed.   Constitutional:       General: She is not in acute distress.     Appearance: Normal appearance. She is not ill-appearing.   HENT:      Head: Normocephalic.      Right Ear: External ear normal.      Left Ear: External ear normal.      Nose: Nose normal.   Eyes:      Pupils: Pupils are equal, round, and reactive to light.   Cardiovascular:      Rate and Rhythm: Normal rate and regular rhythm.      Pulses: Normal pulses.      Heart sounds: Normal heart sounds.   Pulmonary:      Effort: Pulmonary effort is normal. No respiratory distress.      Breath sounds: Normal breath sounds. No stridor. No wheezing, rhonchi or rales.   Chest:      Chest wall: No tenderness.   Musculoskeletal:         General: Swelling and tenderness present. No deformity or signs of injury.      " Cervical back: Normal range of motion and neck supple.      Right lower leg: No edema.      Left lower leg: No edema.      Left foot: Decreased range of motion. Normal capillary refill. Swelling, tenderness and bony tenderness present. No deformity. Normal pulse.        Feet:    Lymphadenopathy:      Cervical: No cervical adenopathy.   Skin:     General: Skin is warm and dry.      Capillary Refill: Capillary refill takes less than 2 seconds.   Neurological:      General: No focal deficit present.      Mental Status: She is alert and oriented to person, place, and time.   Psychiatric:         Mood and Affect: Mood normal.         Behavior: Behavior normal.

## 2025-01-09 NOTE — PATIENT INSTRUCTIONS
Official radiology read: No acute osseous abnormality.     Take prednisone as prescribed - take in the morning with food    Tylenol/Ibuprofen for pain  Wear ortho boot for support and use crutches  Ice 20 minutes 3-4 times per day for 3 days  Insulate the skin from the ice to prevent frostbite  Rest and Elevate  Follow up with orthopedic - referral placed today.     Follow up with PCP in 3-5 days.  Proceed to  ER if symptoms worsen.    If tests are performed, our office will contact you with results only if changes need to made to the care plan discussed with you at the visit. You can review your full results on [facility].

## 2025-01-13 ENCOUNTER — TELEPHONE (OUTPATIENT)
Age: 65
End: 2025-01-13

## 2025-01-13 NOTE — TELEPHONE ENCOUNTER
Caller: Patient    Doctor/Office: Mariela    Call regarding :  appt     Call was transferred to: pod

## 2025-01-21 ENCOUNTER — OFFICE VISIT (OUTPATIENT)
Dept: PODIATRY | Age: 65
End: 2025-01-21
Payer: COMMERCIAL

## 2025-01-21 VITALS — WEIGHT: 195 LBS | HEIGHT: 67 IN | BODY MASS INDEX: 30.61 KG/M2

## 2025-01-21 DIAGNOSIS — Z72.0 TOBACCO ABUSE: ICD-10-CM

## 2025-01-21 DIAGNOSIS — M79.672 CHRONIC PAIN IN LEFT FOOT: Primary | ICD-10-CM

## 2025-01-21 DIAGNOSIS — G89.29 CHRONIC PAIN IN LEFT FOOT: Primary | ICD-10-CM

## 2025-01-21 DIAGNOSIS — R60.0 EDEMA OF LEFT FOOT: ICD-10-CM

## 2025-01-21 PROCEDURE — 99203 OFFICE O/P NEW LOW 30 MIN: CPT | Performed by: STUDENT IN AN ORGANIZED HEALTH CARE EDUCATION/TRAINING PROGRAM

## 2025-01-21 NOTE — ASSESSMENT & PLAN NOTE
Orders:    Ambulatory Referral to Orthopedic Surgery    MRI foot/forefoot toes left wo contrast; Future

## 2025-01-21 NOTE — PROGRESS NOTES
"Name: Yakelin Earl      : 1960      MRN: [id number]  Encounter Provider: Jeri Sierra DPM  Encounter Date: 2025   Encounter department: [facility] [place] [facility] [place]  :  Assessment & Plan  Chronic pain in left foot    Orders:    Ambulatory Referral to Orthopedic Surgery    MRI foot/forefoot toes left wo contrast; Future    Edema of left foot    Orders:    MRI foot/forefoot toes left wo contrast; Future    Tobacco abuse         Imaging Reviewed at this visit (I personally reviewed/independently interpreted images and reports in PACS)  XR left foot 3v NWB 25: No acute osseous abnormalities noted. Midfoot arthritic changes. Plantar calcaneal spur. Os peroneum.       PLAN:  I reviewed clinical exam and radiographic imaging (XR) with patient in detail today. I have discussed with the patient the pathophysiology of this diagnosis and reviewed how the examination correlates with this diagnosis.  Urgent care note from 25 reviewed    Patient has significant pain to left lateral foot. Ddx include stress fracture, arthritis, tendonitis.   MRI left foot ordered to assess further   C/w WBAT tall CAM boot. Limit WB as much as possible. Rolling knee scooter vs cane/crutches prn  Consider medrol dose pack vs CSI pending results of MRI  Smoking cessation discussed  F/u 2wks    History of Present Illness   HPI  Yakelin Earl is a 64 y.o. female who presents to clinic for left foot pain and swelling. Notes current issue has been present since 2025 without trauma. Notes she has had pain and swelling to this same area in the past, but it has normally gone away. Has h/o stress fracture. Went to urgent care 25 and had XR taken showing \"calcification.\" Was placed in a tall CAM boot but patient notes this hurts the foot. Using Tylenol which helps a bit. Cannot take NSAIDs due to CKD.   Current smoker. + CKD.       Review of Systems   Constitutional:  Negative for activity change, " "chills and fever.   HENT: Negative.     Respiratory:  Negative for cough, chest tightness and shortness of breath.    Cardiovascular:  Negative for chest pain and leg swelling.   Endocrine: Negative.    Genitourinary: Negative.    Musculoskeletal:  Positive for gait problem.   Neurological:  Negative for numbness.   Psychiatric/Behavioral: Negative.  Negative for agitation and behavioral problems.           Objective   Ht 5' 7\" (1.702 m)   Wt 88.5 kg (195 lb)   BMI 30.54 kg/m²      Physical Exam  Constitutional:       General: She is not in acute distress.     Appearance: Normal appearance. She is not ill-appearing.   Cardiovascular:      Pulses: Normal pulses.      Comments: Left foot warm, pulses palpable, no calf pain.   Pulmonary:      Effort: Pulmonary effort is normal.   Musculoskeletal:         General: Swelling (left dorsal-lateral midfoot), tenderness (Left foot along lateral column, attn 5th met base, cuboid, 4/5 TMTJs extending to midshaft) and deformity (midfoot arthritic lipping left) present.      Comments: MMT 5/5 in all planes. Toe and ankle ROM intact and without pain.   Gastroc-soleal equinus.    Skin:     General: Skin is warm and dry.      Capillary Refill: Capillary refill takes less than 2 seconds.      Findings: No erythema or lesion.   Neurological:      General: No focal deficit present.      Mental Status: She is alert and oriented to person, place, and time.      Sensory: No sensory deficit.           "

## 2025-01-22 ENCOUNTER — RESULTS FOLLOW-UP (OUTPATIENT)
Dept: PODIATRY | Age: 65
End: 2025-01-22

## 2025-01-22 ENCOUNTER — HOSPITAL ENCOUNTER (OUTPATIENT)
Dept: MRI IMAGING | Facility: HOSPITAL | Age: 65
Discharge: HOME | End: 2025-01-22
Attending: STUDENT IN AN ORGANIZED HEALTH CARE EDUCATION/TRAINING PROGRAM
Payer: COMMERCIAL

## 2025-01-22 ENCOUNTER — TELEPHONE (OUTPATIENT)
Age: 65
End: 2025-01-22

## 2025-01-22 DIAGNOSIS — M79.672 CHRONIC PAIN IN LEFT FOOT: ICD-10-CM

## 2025-01-22 DIAGNOSIS — R60.0 EDEMA OF LEFT FOOT: ICD-10-CM

## 2025-01-22 DIAGNOSIS — G89.29 CHRONIC PAIN IN LEFT FOOT: ICD-10-CM

## 2025-01-22 PROCEDURE — 73718 MRI LOWER EXTREMITY W/O DYE: CPT

## 2025-01-22 NOTE — TELEPHONE ENCOUNTER
Doctor: Mariela  Caller Name: Kerry  #: [phone] opt#[phone]    [facility] called to speak to clinical team regarding MRI ordered by provider.    *No call back necessary unless provider has questions.*

## 2025-02-04 ENCOUNTER — OFFICE VISIT (OUTPATIENT)
Dept: PODIATRY | Age: 65
End: 2025-02-04
Payer: COMMERCIAL

## 2025-02-04 VITALS — HEIGHT: 67 IN | BODY MASS INDEX: 30.54 KG/M2

## 2025-02-04 DIAGNOSIS — M79.672 LEFT FOOT PAIN: Primary | ICD-10-CM

## 2025-02-04 DIAGNOSIS — S92.215A CLOSED NONDISPLACED FRACTURE OF CUBOID OF LEFT FOOT, INITIAL ENCOUNTER: ICD-10-CM

## 2025-02-04 PROCEDURE — 99213 OFFICE O/P EST LOW 20 MIN: CPT | Performed by: STUDENT IN AN ORGANIZED HEALTH CARE EDUCATION/TRAINING PROGRAM

## 2025-02-04 NOTE — LETTER
February 4, 2025     Patient: Yakelin Earl  YOB: 1960  Date of Visit: 2/4/2025      To Whom it May Concern:    Yakelin Earl is under my professional care. Yakelin was seen in my office on 2/4/2025. Yakelin may return to work with limitations - must be seated while working at [employer]. Must wear CAM boot and use crutches or rolling knee scooter .    If you have any questions or concerns, please don't hesitate to call.         Sincerely,          Jeri Sierra DPM        CC: No Recipients

## 2025-02-04 NOTE — PROGRESS NOTES
"Name: Yakelin Earl      : 1960      MRN: [id number]  Encounter Provider: Jeri Sierra DPM  Encounter Date: 2025   Encounter department: [facility] PODIATRY [place]  :  Assessment & Plan  Left foot pain         Closed nondisplaced fracture of cuboid of left foot, initial encounter         Imaging Reviewed at this visit (I personally reviewed/independently interpreted images and reports in PACS)  MRI left foot 25: Subchondral slightly impacted fracture at the distal aspect of the cuboid with prominent reactive marrow edema. Degenerative change across the medial aspect of the navicular and first cuneiform with subcortical cystic changes and reactive marrow edema noted. Additional midfoot degenerative changes also appreciated.    Prior Images  XR left foot 3v NWB 25: No acute osseous abnormalities noted. Midfoot arthritic changes. Plantar calcaneal spur. Os peroneum.       PLAN:  MRI left foot reviewed as above. Noted with subchondral fracture of distal cuboid. I discussed that this is likely due to a stress fracture from lateral column overload. Will treat this conservatively with 6-8wks NWB followed by WB in tall CAM boot. C/w rolling knee scooter vs cane/crutches   Smoking cessation discussed. I discussed risk of slow bone healing with this  F/u PCP for dexa scan  May return to work but only do  and be seated.   F/u 4wks; WB left foot XR needed    History of Present Illness   HPI  Yakelin Earl is a 64 y.o. female who presents to clinic for left foot pain and swelling f/u. Had MRI showing fracture. Doing well in CAM boot but not NWB fully. Trying her best. Does have rolling knee scooter and crutches. Wants to go back to work.     Initial HPI \"Notes current issue has been present since 2025 without trauma. Notes she has had pain and swelling to this same area in the past, but it has normally gone away. Has h/o stress fracture. Went to urgent care 25 " "and had XR taken showing \"calcification.\" Was placed in a tall CAM boot but patient notes this hurts the foot. Using Tylenol which helps a bit. Cannot take NSAIDs due to CKD.   Current smoker. + CKD.\"       Review of Systems   Constitutional:  Negative for activity change, chills and fever.   HENT: Negative.     Respiratory:  Negative for cough, chest tightness and shortness of breath.    Cardiovascular:  Negative for chest pain and leg swelling.   Endocrine: Negative.    Genitourinary: Negative.    Musculoskeletal:  Positive for gait problem.   Neurological:  Negative for numbness.   Psychiatric/Behavioral: Negative.  Negative for agitation and behavioral problems.           Objective   Ht 5' 7\" (1.702 m)   BMI 30.54 kg/m²      Physical Exam  Constitutional:       General: She is not in acute distress.     Appearance: Normal appearance. She is not ill-appearing.   Cardiovascular:      Pulses: Normal pulses.      Comments: Left foot warm, pulses palpable, no calf pain.   Pulmonary:      Effort: Pulmonary effort is normal.   Musculoskeletal:         General: Swelling (left dorsal-lateral midfoot), tenderness (Left foot along lateral column at cuboid) and deformity (midfoot arthritic lipping left) present.      Comments: MMT 5/5 in all planes. Toe and ankle ROM intact and without pain.   Gastroc-soleal equinus.     Left foot pain resolved to 5th met base, 4/5 TMTJs extending to midshaft   Skin:     General: Skin is warm and dry.      Capillary Refill: Capillary refill takes less than 2 seconds.      Findings: No erythema or lesion.   Neurological:      General: No focal deficit present.      Mental Status: She is alert and oriented to person, place, and time.      Sensory: No sensory deficit.           "

## 2025-03-04 ENCOUNTER — OFFICE VISIT (OUTPATIENT)
Dept: PODIATRY | Age: 65
End: 2025-03-04
Payer: COMMERCIAL

## 2025-03-04 ENCOUNTER — HOSPITAL ENCOUNTER (OUTPATIENT)
Dept: RADIOLOGY | Facility: CLINIC | Age: 65
Discharge: HOME | End: 2025-03-04
Payer: COMMERCIAL

## 2025-03-04 VITALS — HEIGHT: 67 IN | WEIGHT: 198 LBS | BODY MASS INDEX: 31.08 KG/M2

## 2025-03-04 DIAGNOSIS — M79.672 LEFT FOOT PAIN: ICD-10-CM

## 2025-03-04 DIAGNOSIS — S92.215D CLOSED NONDISPLACED FRACTURE OF CUBOID OF LEFT FOOT WITH ROUTINE HEALING, SUBSEQUENT ENCOUNTER: Primary | ICD-10-CM

## 2025-03-04 PROBLEM — S92.215A CLOSED NONDISPLACED FRACTURE OF CUBOID OF LEFT FOOT, INITIAL ENCOUNTER: Status: ACTIVE | Noted: 2025-03-04

## 2025-03-04 PROCEDURE — 73630 X-RAY EXAM OF FOOT: CPT

## 2025-03-04 PROCEDURE — 99213 OFFICE O/P EST LOW 20 MIN: CPT | Performed by: STUDENT IN AN ORGANIZED HEALTH CARE EDUCATION/TRAINING PROGRAM

## 2025-03-04 NOTE — LETTER
March 4, 2025     Patient: Yakelin Earl  YOB: 1960  Date of Visit: 3/4/2025      To Whom it May Concern:    Yakelin Earl is under my professional care. Yakelin was seen in my office on 3/4/2025. Yakelin may return to work on 3/10/25 but must be able to wear the CAM boot .    If you have any questions or concerns, please don't hesitate to call.         Sincerely,          Jeri Sierra DPM        CC: No Recipients

## 2025-03-04 NOTE — PROGRESS NOTES
"Name: Yakelin Earl      : 1960      MRN: [id number]  Encounter Provider: Jeri Sierra DPM  Encounter Date: 3/4/2025   Encounter department: [facility] [place]  :  Assessment & Plan  Left foot pain    Orders:    XR foot 3+ vw left; Future    Cam Boot    Closed nondisplaced fracture of cuboid of left foot with routine healing, subsequent encounter    Orders:    Cam Boot    Imaging Reviewed at this visit (I personally reviewed/independently interpreted images and reports in PACS)  XR left foot 3v WB 3/4/25: stable XR, distal cuboid with slight fracture line noted    Prior Images  XR left foot 3v NWB 25: No acute osseous abnormalities noted. Midfoot arthritic changes. Plantar calcaneal spur. Os peroneum.    MRI left foot 25: Subchondral slightly impacted fracture at the distal aspect of the cuboid with prominent reactive marrow edema. Degenerative change across the medial aspect of the navicular and first cuneiform with subcortical cystic changes and reactive marrow edema noted. Additional midfoot degenerative changes also appreciated.     PLAN:  MRI left foot as above. Noted with subchondral fracture of distal cuboid. I discussed that this is likely due to a stress fracture from lateral column overload. Patient has been NWB 6wks thus far and pain has resolved. She is to WBAT in tall CAM boot for another 4wks.   Smoking cessation discussed. I discussed risk of slow bone healing with this  F/u PCP for dexa scan  May return to work w/ use of CAM boot. Short CAM boot dispensed per patient request  F/u 4wks; WB left foot XR needed    History of Present Illness   HPI  Yakelin Earl is a 64 y.o. female who presents to clinic for left foot f/u. Notes pain has resolved. Had been NWB until last week then started putting weight on foot in CAM boot. - N/T/B.    \"left foot pain and swelling f/u. Had MRI showing fracture. Doing well in CAM boot but not NWB fully. Trying her best. " "Does have rolling knee scooter and crutches. Wants to go back to work.\"     Initial HPI \"Notes current issue has been present since January 3rd 2025 without trauma. Notes she has had pain and swelling to this same area in the past, but it has normally gone away. Has h/o stress fracture. Went to urgent care 1/9/25 and had XR taken showing \"calcification.\" Was placed in a tall CAM boot but patient notes this hurts the foot. Using Tylenol which helps a bit. Cannot take NSAIDs due to CKD.   Current smoker. + CKD.\"       Review of Systems   Constitutional:  Negative for activity change, chills and fever.   HENT: Negative.     Respiratory:  Negative for cough, chest tightness and shortness of breath.    Cardiovascular:  Negative for chest pain and leg swelling.   Endocrine: Negative.    Genitourinary: Negative.    Musculoskeletal:  Positive for gait problem.   Neurological:  Negative for numbness.   Psychiatric/Behavioral: Negative.  Negative for agitation and behavioral problems.           Objective   Ht 5' 7\" (1.702 m)   Wt 89.8 kg (198 lb)   BMI 31.01 kg/m²      Physical Exam  Constitutional:       General: She is not in acute distress.     Appearance: Normal appearance. She is not ill-appearing.   Cardiovascular:      Pulses: Normal pulses.      Comments: Left foot warm, pulses palpable, no calf pain.   Pulmonary:      Effort: Pulmonary effort is normal.   Musculoskeletal:         General: Deformity (midfoot arthritic lipping left) present. No swelling (left dorsal-lateral midfoot) or tenderness (Left foot along lateral column at cuboid resolved).      Comments: MMT 5/5 in all planes. Toe and ankle ROM intact and without pain.   Gastroc-soleal equinus.     Left foot pain resolved to 5th met base, 4/5 TMTJs extending to midshaft   Skin:     General: Skin is warm and dry.      Capillary Refill: Capillary refill takes less than 2 seconds.      Findings: No erythema or lesion.   Neurological:      General: No focal " deficit present.      Mental Status: She is alert and oriented to person, place, and time.      Sensory: No sensory deficit.

## 2025-03-05 ENCOUNTER — TELEPHONE (OUTPATIENT)
Age: 65
End: 2025-03-05

## 2025-03-05 NOTE — TELEPHONE ENCOUNTER
Caller: Yakelin    Doctor: Dr. Sierra    Reason for call: Ws in yesterday and received a left walking boot, however it is too big can she stop by the office to get a smaller one    Call back#: [phone]

## 2025-03-07 NOTE — TELEPHONE ENCOUNTER
Tried to reach the patient again. No answer voice mailbox is full. Will send a message through [employer].

## 2025-04-02 ENCOUNTER — OFFICE VISIT (OUTPATIENT)
Dept: PODIATRY | Age: 65
End: 2025-04-02
Payer: COMMERCIAL

## 2025-04-02 ENCOUNTER — HOSPITAL ENCOUNTER (OUTPATIENT)
Dept: RADIOLOGY | Facility: CLINIC | Age: 65
Discharge: HOME | End: 2025-04-02
Payer: COMMERCIAL

## 2025-04-02 VITALS — BODY MASS INDEX: 30.89 KG/M2 | HEIGHT: 67 IN | WEIGHT: 196.8 LBS

## 2025-04-02 DIAGNOSIS — M79.672 PAIN OF LEFT HEEL: ICD-10-CM

## 2025-04-02 DIAGNOSIS — S92.215D CLOSED NONDISPLACED FRACTURE OF CUBOID OF LEFT FOOT WITH ROUTINE HEALING, SUBSEQUENT ENCOUNTER: ICD-10-CM

## 2025-04-02 DIAGNOSIS — S92.215D CLOSED NONDISPLACED FRACTURE OF CUBOID OF LEFT FOOT WITH ROUTINE HEALING, SUBSEQUENT ENCOUNTER: Primary | ICD-10-CM

## 2025-04-02 DIAGNOSIS — G89.29 CHRONIC LOW BACK PAIN WITH LEFT-SIDED SCIATICA, UNSPECIFIED BACK PAIN LATERALITY: ICD-10-CM

## 2025-04-02 DIAGNOSIS — M54.42 CHRONIC LOW BACK PAIN WITH LEFT-SIDED SCIATICA, UNSPECIFIED BACK PAIN LATERALITY: ICD-10-CM

## 2025-04-02 PROCEDURE — 99213 OFFICE O/P EST LOW 20 MIN: CPT | Performed by: STUDENT IN AN ORGANIZED HEALTH CARE EDUCATION/TRAINING PROGRAM

## 2025-04-02 PROCEDURE — 73630 X-RAY EXAM OF FOOT: CPT

## 2025-04-02 NOTE — PATIENT INSTRUCTIONS
Foot Pain Home Therapy    Stretch. Place painful foot in back with heel on ground and lean against wall. Do not lift heel off of ground. You will feel the stretch in the calf muscles and possibly the heel. Hold the stretch for 20-30 seconds. Do this at least 5-6 times per day. It is best done after exercise when your muscles are warm.  Wear supportive shoes at all times. Avoid flip-flops, flat sandals, barefoot walking (never walk barefoot, even at home). Generally avoid shoes that are too flexible and bend in the arch. Your shoes should only slightly bend in the toe area, not the middle. Running sneakers are often the best choice.  Supportive sneaker brands: Jones, On Cloud, Hoka, Altra, New Balance, Asics, Mizuno  [employer] (discount if mention Dr bhakta)  Fleet Feet [place]  [facility]   [employer]  Supportive daily shoe brands: Vionic, Orthofeet, Radha, Dansko, Chacos, Tucker, Teva, Birkenstock  Supportive home shoes: Maureen Cherry (recovery slides)  Purchase over the counter topical pain creams such as Voltarin gel, biofreeze, or CBD cream - will need to apply 2-3 times per day for benefit. + deep tissue massage.   Look in to over the counter shoe inserts/arch supports such as Vasyli-Dananberg (remove tabs under 1st toe) arch supports. These are all available on [employer].com as well as their individual website's.   [employer]: Vasyli+Dananberg 1st Ray Orthotic, Medium, 1st Ray Function, Medium Density, Full-Length Insole, Heat Molding Optional, Best All Around Orthotic, Functional Biomechanical Control for Pain Relief, Black Yellow (81956) : Health & Household   VASYLI + Dananberg -- Vasyli Medical          Achilles Tendon Stretching Exercises    A) Standing Gastrocnemius stretch  Place hands on wall or chair. If using wall, put your hands at eye level.  Step the leg you want to stretch behind you. Keep your back heel on the floor and point your toes straight ahead or  slightly inward towards the heel of the opposite foot.   Bend your knee toward the wall while keeping your back leg straight.  Lean toward the wall until you feel a gentle stretch in you calf of the straight leg. Don't lean so far that you feel pain.  Hold for 15 seconds. Complete 3 reps.    B) Standing soleus stretch  Place your hands on the wall or chair. If using wall, put your hands at eye level.  Step the leg you want to stretch behind you (your back foot will need to be closer to the front foot than the above stretch). Keep your back heel on the floor and point your toes straight ahead or slightly inward towards the heel of the opposite foot.   Bend both your front and back knee at the same time (may help to stick your butt out). You do not need to lean towards the wall, just bend the knees.   Lean toward the wall until you feel a gentle stretch in you calf of the straight leg. Don't lean so far that you feel pain.  Hold for 15 seconds. Complete 3 reps.          Keep these tips and tricks in mind to get the most out of your stretching;  Take your time - move slowly, whether you are deepening into a stretch or changing positions. This will limit the risk of injury & discomfort.  Avoid bouncing - quick sudden movements will only worsen achilles tendon issues. Stay relaxed during stretch.  Keep your heel down and toes straight ahead or slightly inward - this will allow the achilles tendon to stretch properly.   Stop if you feel pain - Don't strain or force your muscle. If you feel sharp pain, stop immediately.

## 2025-04-02 NOTE — PROGRESS NOTES
Name: Yakelin Earl      : 1960      MRN: [id number]  Encounter Provider: Jeri Sierra DPM  Encounter Date: 2025   Encounter department: [facility] PODIATRY [place]  :  Assessment & Plan  Closed nondisplaced fracture of cuboid of left foot with routine healing, subsequent encounter    Orders:    XR foot 3+ vw left; Future    Ambulatory referral to Physical Therapy; Future    Orthotics B/L    Pain of left heel    Orders:    Ambulatory referral to Physical Therapy; Future    Ambulatory referral to Spine & Pain Management; Future    Orthotics B/L    Ambulatory referral to Physical Therapy; Future    Chronic low back pain with left-sided sciatica, unspecified back pain laterality    Orders:    Ambulatory referral to Spine & Pain Management; Future    Imaging Reviewed at this visit (I personally reviewed/independently interpreted images and reports in PACS)  XR left foot WB 3v 25: stable XR attn cuboid. + midfoot arthritic changes. Plantar calcaneal spur. Slightly elevated distal 1st met    Prior Images  XR left foot 3v WB 3/4/25: stable XR, distal cuboid with slight fracture line noted  XR left foot 3v NWB 25: No acute osseous abnormalities noted. Midfoot arthritic changes. Plantar calcaneal spur. Os peroneum.    MRI left foot 25: Subchondral slightly impacted fracture at the distal aspect of the cuboid with prominent reactive marrow edema. Degenerative change across the medial aspect of the navicular and first cuneiform with subcortical cystic changes and reactive marrow edema noted. Additional midfoot degenerative changes also appreciated.     PLAN:  MRI left foot as above. Noted with subchondral fracture of distal cuboid. I discussed that this is likely due to a stress fracture from lateral column overload. Patient has been NWB 6wks followed by WBAT in tall CAM boot for another 4wks. Pain improving well, may transition to supportive, stiff bottomed sneaker with  Problem: Skin Integrity:  Goal: Skin integrity will stabilize  Skin integrity will stabilize   Skin is intact. "vasyli-dananberg arch support if CMO's not covered by insurance. Bring CAM boot to work and use PRN  CMO's rx'd via [facility]  I referred pt to PT for heel pain and residual cuboid pain  Pain mngmt referral given for back pain (as this could also be a cause of heel pain)  Smoking cessation discussed. I discussed risk of slow bone healing with this  Dexa scan pending  F/u 4wks for recheck    History of Present Illness   HPI  Yakelin Earl is a 64 y.o. female who presents to clinic for left foot f/u. Has been WB tall CAM boot. Notes intermittent tenderness to foot but feels good in CAM boot.   + Pain to plantar left foot. + lower back pain with numbness to LE/foot.     \"Notes pain has resolved. Had been NWB until last week then started putting weight on foot in CAM boot. - N/T/B.\"    \"left foot pain and swelling f/u. Had MRI showing fracture. Doing well in CAM boot but not NWB fully. Trying her best. Does have rolling knee scooter and crutches. Wants to go back to work.\"     Initial HPI \"Notes current issue has been present since January 3rd 2025 without trauma. Notes she has had pain and swelling to this same area in the past, but it has normally gone away. Has h/o stress fracture. Went to urgent care 1/9/25 and had XR taken showing \"calcification.\" Was placed in a tall CAM boot but patient notes this hurts the foot. Using Tylenol which helps a bit. Cannot take NSAIDs due to CKD.   Current smoker. + CKD.\"       Review of Systems   Constitutional:  Negative for activity change, chills and fever.   HENT: Negative.     Respiratory:  Negative for cough, chest tightness and shortness of breath.    Cardiovascular:  Negative for chest pain and leg swelling.   Endocrine: Negative.    Genitourinary: Negative.    Musculoskeletal:  Positive for gait problem.   Neurological:  Negative for numbness.   Psychiatric/Behavioral: Negative.  Negative for agitation and behavioral problems.           Objective   Ht 5' 7\" (1.702 m)   Wt " 89.3 kg (196 lb 12.8 oz)   BMI 30.82 kg/m²      Physical Exam  Constitutional:       General: She is not in acute distress.     Appearance: Normal appearance. She is not ill-appearing.   Cardiovascular:      Pulses: Normal pulses.      Comments: Left foot warm, pulses palpable, no calf pain.   Pulmonary:      Effort: Pulmonary effort is normal.   Musculoskeletal:         General: Tenderness (Left foot along lateral column at cuboid - scant today. slight pain to plantar left heel.) and deformity (midfoot arthritic lipping left) present. No swelling (left dorsal-lateral midfoot).      Comments: MMT 5/5 in all planes. Toe and ankle ROM intact and without pain.   Gastroc-soleal equinus.     Left foot pain resolved to 5th met base, 4/5 TMTJs extending to midshaft   Skin:     General: Skin is warm and dry.      Capillary Refill: Capillary refill takes less than 2 seconds.      Findings: No erythema or lesion.   Neurological:      General: No focal deficit present.      Mental Status: She is alert and oriented to person, place, and time.      Sensory: No sensory deficit.

## 2025-04-02 NOTE — ASSESSMENT & PLAN NOTE
Orders:    Ambulatory referral to Physical Therapy; Future    Ambulatory referral to Spine & Pain Management; Future    Orthotics B/L    Ambulatory referral to Physical Therapy; Future

## 2025-04-02 NOTE — ASSESSMENT & PLAN NOTE
Orders:    XR foot 3+ vw left; Future    Ambulatory referral to Physical Therapy; Future    Orthotics B/L

## 2025-05-01 ENCOUNTER — OFFICE VISIT (OUTPATIENT)
Dept: PODIATRY | Age: 65
End: 2025-05-01
Payer: COMMERCIAL

## 2025-05-01 VITALS — BODY MASS INDEX: 30.57 KG/M2 | WEIGHT: 194.8 LBS | HEIGHT: 67 IN

## 2025-05-01 DIAGNOSIS — S92.215D CLOSED NONDISPLACED FRACTURE OF CUBOID OF LEFT FOOT WITH ROUTINE HEALING, SUBSEQUENT ENCOUNTER: Primary | ICD-10-CM

## 2025-05-01 DIAGNOSIS — M79.672 PAIN OF LEFT HEEL: ICD-10-CM

## 2025-05-01 DIAGNOSIS — M54.42 CHRONIC LOW BACK PAIN WITH LEFT-SIDED SCIATICA, UNSPECIFIED BACK PAIN LATERALITY: ICD-10-CM

## 2025-05-01 DIAGNOSIS — Z72.0 TOBACCO ABUSE: ICD-10-CM

## 2025-05-01 DIAGNOSIS — G89.29 CHRONIC LOW BACK PAIN WITH LEFT-SIDED SCIATICA, UNSPECIFIED BACK PAIN LATERALITY: ICD-10-CM

## 2025-05-01 PROCEDURE — 99214 OFFICE O/P EST MOD 30 MIN: CPT | Performed by: STUDENT IN AN ORGANIZED HEALTH CARE EDUCATION/TRAINING PROGRAM

## 2025-05-01 RX ORDER — GABAPENTIN 100 MG/1
100 CAPSULE ORAL
Qty: 30 CAPSULE | Refills: 0 | Status: SHIPPED | OUTPATIENT
Start: 2025-05-01

## 2025-05-01 NOTE — PROGRESS NOTES
Name: Yakelin Earl      : 1960      MRN: [id number]  Encounter Provider: Jeri Sierra DPM  Encounter Date: 2025   Encounter department: [facility] PODIATRY [place]  :  Assessment & Plan  Closed nondisplaced fracture of cuboid of left foot with routine healing, subsequent encounter    Orders:    MRI foot/forefoot toes left wo contrast; Future    Pain of left heel    Orders:    MRI foot/forefoot toes left wo contrast; Future    gabapentin (Neurontin) 100 mg capsule; Take 1 capsule (100 mg total) by mouth daily at bedtime    Chronic low back pain with left-sided sciatica, unspecified back pain laterality    Orders:    gabapentin (Neurontin) 100 mg capsule; Take 1 capsule (100 mg total) by mouth daily at bedtime    Tobacco abuse           Prior Imaging   XR left foot WB 3v 25: stable XR attn cuboid. + midfoot arthritic changes. Plantar calcaneal spur. Slightly elevated distal 1st met  XR left foot 3v WB 3/4/25: stable XR, distal cuboid with slight fracture line noted  XR left foot 3v NWB 25: No acute osseous abnormalities noted. Midfoot arthritic changes. Plantar calcaneal spur. Os peroneum.    MRI left foot 25: Subchondral slightly impacted fracture at the distal aspect of the cuboid with prominent reactive marrow edema. Degenerative change across the medial aspect of the navicular and first cuneiform with subcortical cystic changes and reactive marrow edema noted. Additional midfoot degenerative changes also appreciated.     PLAN:  MRI left foot as above w/ subchondral fracture of distal cuboid. I discussed that this is likely due to a stress fracture from lateral column overload. Patient has been NWB 6wks followed by WBAT in tall CAM boot for another 4wks. Pain was improving well but has worsened over the last few weeks due to more standing at work. Repeat MRI left foot ordered to assess healing of fracture which I discussed can be slowed due to current cigarette  "smoking  Gabapentin 100 mg HS ordered for patient; counseled regarding sedative effects of taking this medication.  Counseled not to take medication while driving or operating heavy machinery/using stairs.   C/w supportive, stiff bottomed sneaker with vasyli-dananberg arch support. Bring CAM boot to work and use PRN  CMO's rx'd via [facility] last appt, pt to schedule  I referred pt to PT last appt for heel pain and residual cuboid pain, hold off until MRI returns  Pain mngmt referral pending for back pain  Smoking cessation discussed. I discussed risk of slow bone healing with this  Dexa scan pending  I will call with results of MRI (bone stim if fracture still present, will give f/u at that time)    History of Present Illness   HPI  Yakelin Earl is a 64 y.o. female who presents to clinic for left foot f/u. Notes pain on and off, worsening over the past weeks due to standing on feet more at work. Notes swelling.   Has upcoming appt with pain mngmt for back.   Did not got to PT.     \"Has been WB tall CAM boot. Notes intermittent tenderness to foot but feels good in CAM boot.   + Pain to plantar left foot. + lower back pain with numbness to LE/foot.\"     \"Notes pain has resolved. Had been NWB until last week then started putting weight on foot in CAM boot. - N/T/B.\"    \"left foot pain and swelling f/u. Had MRI showing fracture. Doing well in CAM boot but not NWB fully. Trying her best. Does have rolling knee scooter and crutches. Wants to go back to work.\"     Initial HPI \"Notes current issue has been present since January 3rd 2025 without trauma. Notes she has had pain and swelling to this same area in the past, but it has normally gone away. Has h/o stress fracture. Went to urgent care 1/9/25 and had XR taken showing \"calcification.\" Was placed in a tall CAM boot but patient notes this hurts the foot. Using Tylenol which helps a bit. Cannot take NSAIDs due to CKD.   Current smoker. + CKD.\"       Review of Systems " "  Constitutional:  Negative for activity change, chills and fever.   HENT: Negative.     Respiratory:  Negative for cough, chest tightness and shortness of breath.    Cardiovascular:  Negative for chest pain and leg swelling.   Endocrine: Negative.    Genitourinary: Negative.    Musculoskeletal:  Positive for gait problem.   Neurological:  Negative for numbness.   Psychiatric/Behavioral: Negative.  Negative for agitation and behavioral problems.           Objective   Ht 5' 7\" (1.702 m)   Wt 88.4 kg (194 lb 12.8 oz)   BMI 30.51 kg/m²      Physical Exam  Constitutional:       General: She is not in acute distress.     Appearance: Normal appearance. She is not ill-appearing.   Cardiovascular:      Pulses: Normal pulses.      Comments: Left foot warm, pulses palpable, no calf pain.   Pulmonary:      Effort: Pulmonary effort is normal.   Musculoskeletal:         General: Swelling (left dorsal-lateral midfoot), tenderness (Left foot along lateral column at cuboid. slight pain to plantar left heel.) and deformity (midfoot arthritic lipping left) present.      Comments: MMT 5/5 in all planes. Toe and ankle ROM intact and without pain.   Gastroc-soleal equinus.     Left foot pain resolved to 5th met base, 4/5 TMTJs extending to midshaft   Skin:     General: Skin is warm and dry.      Capillary Refill: Capillary refill takes less than 2 seconds.      Findings: No erythema or lesion.   Neurological:      General: No focal deficit present.      Mental Status: She is alert and oriented to person, place, and time.      Sensory: No sensory deficit.           "

## 2025-05-01 NOTE — LETTER
May 1, 2025     Patient: Yakelin Earl  YOB: 1960  Date of Visit: 5/1/2025      To Whom it May Concern:    Yakelin Earl is under my professional care. Yakelin was seen in my office on 5/1/2025. Yakelin may return to work with limitations - please allow her to be seated as needed due to foot pain/fracture .    If you have any questions or concerns, please don't hesitate to call.         Sincerely,          Jeri Sierra DPM        CC: No Recipients

## 2025-05-11 ENCOUNTER — TELEPHONE (OUTPATIENT)
Dept: OTHER | Facility: OTHER | Age: 65
End: 2025-05-11

## 2025-05-11 NOTE — TELEPHONE ENCOUNTER
Patient is calling regarding cancelling an appointment.    Date/Time:05/12/2025/9:00 a.m.    Patient was rescheduled: YES [] NO [x]    Patient requesting call back to reschedule: YES [x] NO []

## 2025-05-15 ENCOUNTER — HOSPITAL ENCOUNTER (OUTPATIENT)
Dept: RADIOLOGY | Facility: CLINIC | Age: 65
End: 2025-05-15
Payer: COMMERCIAL

## 2025-05-15 ENCOUNTER — HOSPITAL ENCOUNTER (OUTPATIENT)
Dept: MRI IMAGING | Facility: HOSPITAL | Age: 65
End: 2025-05-15
Attending: STUDENT IN AN ORGANIZED HEALTH CARE EDUCATION/TRAINING PROGRAM
Payer: COMMERCIAL

## 2025-05-15 VITALS — WEIGHT: 194.4 LBS | BODY MASS INDEX: 32.39 KG/M2 | HEIGHT: 65 IN

## 2025-05-15 DIAGNOSIS — S92.215D CLOSED NONDISPLACED FRACTURE OF CUBOID OF LEFT FOOT WITH ROUTINE HEALING, SUBSEQUENT ENCOUNTER: ICD-10-CM

## 2025-05-15 DIAGNOSIS — M79.672 PAIN OF LEFT HEEL: ICD-10-CM

## 2025-05-15 DIAGNOSIS — Z78.0 POST-MENOPAUSAL: ICD-10-CM

## 2025-05-15 DIAGNOSIS — S92.909S CLOSED FRACTURE OF FOOT, UNSPECIFIED LATERALITY, SEQUELA: ICD-10-CM

## 2025-05-15 PROCEDURE — 73718 MRI LOWER EXTREMITY W/O DYE: CPT

## 2025-05-15 PROCEDURE — 77080 DXA BONE DENSITY AXIAL: CPT

## 2025-05-20 DIAGNOSIS — S92.215K: Primary | ICD-10-CM

## 2025-05-22 ENCOUNTER — DOCUMENTATION (OUTPATIENT)
Dept: OBGYN CLINIC | Facility: CLINIC | Age: 65
End: 2025-05-22

## 2025-05-22 NOTE — PROGRESS NOTES
5/22 Called pt. And lmom.  The order for the Bone stim has been sent to her insurance for review. I will contact her once BS is approved.  6/2 Bone stim approved. Called pt. And will meet 6/11 at [facility] 9:00

## 2025-06-03 ENCOUNTER — OFFICE VISIT (OUTPATIENT)
Dept: PODIATRY | Age: 65
End: 2025-06-03
Payer: COMMERCIAL

## 2025-06-03 VITALS — BODY MASS INDEX: 32.72 KG/M2 | HEIGHT: 65 IN | WEIGHT: 196.4 LBS

## 2025-06-03 DIAGNOSIS — Z72.0 TOBACCO ABUSE: ICD-10-CM

## 2025-06-03 DIAGNOSIS — S92.215K: Primary | ICD-10-CM

## 2025-06-03 DIAGNOSIS — M54.42 CHRONIC LOW BACK PAIN WITH LEFT-SIDED SCIATICA, UNSPECIFIED BACK PAIN LATERALITY: ICD-10-CM

## 2025-06-03 DIAGNOSIS — M84.375A STRESS REACTION OF LEFT FOOT, INITIAL ENCOUNTER: ICD-10-CM

## 2025-06-03 DIAGNOSIS — G89.29 CHRONIC LOW BACK PAIN WITH LEFT-SIDED SCIATICA, UNSPECIFIED BACK PAIN LATERALITY: ICD-10-CM

## 2025-06-03 PROCEDURE — 99214 OFFICE O/P EST MOD 30 MIN: CPT | Performed by: STUDENT IN AN ORGANIZED HEALTH CARE EDUCATION/TRAINING PROGRAM

## 2025-06-03 NOTE — ASSESSMENT & PLAN NOTE
- Nearly healed per MRI as below. Had been NWB 6wks followed by WBAT in tall CAM boot for another 4wks  - Bone stim pending  - CMO's rx'd via [facility] last appt, pt to schedule  - report to PT for formal PT  - c/w supportive shoes and CAM boot prn

## 2025-06-03 NOTE — LETTER
June 5, 2025     Patient: Yakelin Earl  YOB: 1960  Date of Visit: 6/3/2025      To Whom it May Concern:    Yakelin Earl is under my professional care. Yakelin was seen in my office on 6/3/2025. Yakelin may return to work with limitations , she should not work more than 3 consecutive days in a row.    If you have any questions or concerns, please don't hesitate to call.         Sincerely,          Jeri Sierra DPM        CC: No Recipients

## 2025-06-03 NOTE — PROGRESS NOTES
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 Had been NWB 6wks followed by WBAT in tall CAM boot for another 4wks  - Bone stim pending  - CMO's rx'd via [facility] last appt, pt to schedule  - report to PT for formal PT  - c/w supportive shoes and CAM boot prn       Stress reaction of left foot, initial encounter  - New diag seen on MRI as below. Likely due to increased work duties in combination with osteoporosis and smoking  - Bone stim pending   - Can wear tall CAM boot to offload for 2wks then back in sneaker. Can wear CAM boot prn while at work if working long hours  - DXA scan as below. F/u with PCP for further medical mnmgt       Chronic low back pain with left-sided sciatica, unspecified back pain laterality  - f/u appt with spine/pain  - c/w gabapentin 100mg HS until this       Tobacco abuse  - cessation recommended       Imaging reviewed today (I reviewed and independently interpreted in PACs)  MRI left foot 5/15/25: \"Near complete resolution of previously noted nondisplaced subchondral fracture of the distal cuboid with only minimal patchy marrow edema remaining. Stress reaction of the lateral cuneiform, lateral navicular, anterior talus, bases of the fourth and fifth metatarsals, second metatarsal head, and medial hallux sesamoid. Small incomplete stress fracture of the fourth metatarsal head. Moderate midfoot arthropathy. Mild plantar fascial fibromatosis.\"  DXA scan 5/15/25: + osteoporosis    Prior Imaging   XR left foot WB 3v 25: stable XR attn cuboid. + midfoot arthritic changes. Plantar calcaneal spur. Slightly elevated distal 1st met  MRI left foot 25: Subchondral slightly impacted fracture at the " "distal aspect of the cuboid with prominent reactive marrow edema. Degenerative change across the medial aspect of the navicular and first cuneiform with subcortical cystic changes and reactive marrow edema noted. Additional midfoot degenerative changes also appreciated.       History of Present Illness   HPI  Yakelin Earl is a 64 y.o. female who presents to clinic for left foot f/u. Notes foot pain doing better but new anterior ankle/dorsal foot is acting up. Got MRI and DXA scan. PCP yet to f/u about DXA. Wearing [employer] sneakers. Has pain mngmt appt this Friday. Taking gabapentin with some improvement.     \"Notes pain on and off, worsening over the past weeks due to standing on feet more at work. Notes swelling.   Has upcoming appt with pain mngmt for back.   Did not got to PT.\"     \"Has been WB tall CAM boot. Notes intermittent tenderness to foot but feels good in CAM boot.   + Pain to plantar left foot. + lower back pain with numbness to LE/foot.\"     \"Notes pain has resolved. Had been NWB until last week then started putting weight on foot in CAM boot. - N/T/B.\"    \"left foot pain and swelling f/u. Had MRI showing fracture. Doing well in CAM boot but not NWB fully. Trying her best. Does have rolling knee scooter and crutches. Wants to go back to work.\"     Initial HPI \"Notes current issue has been present since January 3rd 2025 without trauma. Notes she has had pain and swelling to this same area in the past, but it has normally gone away. Has h/o stress fracture. Went to urgent care 1/9/25 and had XR taken showing \"calcification.\" Was placed in a tall CAM boot but patient notes this hurts the foot. Using Tylenol which helps a bit. Cannot take NSAIDs due to CKD.   Current smoker. + CKD.\"       Review of Systems   Constitutional:  Negative for activity change, chills and fever.   HENT: Negative.     Respiratory:  Negative for cough, chest tightness and shortness of breath.    Cardiovascular:  Negative for chest " "pain and leg swelling.   Endocrine: Negative.    Genitourinary: Negative.    Musculoskeletal:  Positive for gait problem.   Neurological:  Negative for numbness.   Psychiatric/Behavioral: Negative.  Negative for agitation and behavioral problems.           Objective   Ht 5' 4.75\" (1.645 m)   Wt 89.1 kg (196 lb 6.4 oz)   BMI 32.94 kg/m²      Physical Exam  Constitutional:       General: She is not in acute distress.     Appearance: Normal appearance. She is not ill-appearing.     Cardiovascular:      Pulses: Normal pulses.      Comments: Left foot warm, pulses palpable, no calf pain.   Pulmonary:      Effort: Pulmonary effort is normal.     Musculoskeletal:         General: Tenderness (scant lateral cuboid. + tenderness dorsal-lateral talar head/anterior ankle) and deformity (midfoot arthritic lipping left) present. No swelling (left dorsal-lateral midfoot resolved).      Comments: MMT 5/5 in all planes. Toe and ankle ROM intact and without pain.   Gastroc-soleal equinus.     Left foot pain resolved to 5th met base, 4/5 TMTJs extending to midshaft, heel     Skin:     General: Skin is warm and dry.      Capillary Refill: Capillary refill takes less than 2 seconds.      Findings: No erythema or lesion.     Neurological:      General: No focal deficit present.      Mental Status: She is alert and oriented to person, place, and time.      Sensory: No sensory deficit.           "

## 2025-06-11 ENCOUNTER — HOSPITAL ENCOUNTER (OUTPATIENT)
Dept: RADIOLOGY | Facility: CLINIC | Age: 65
Discharge: HOME | End: 2025-06-11
Payer: COMMERCIAL

## 2025-06-11 ENCOUNTER — DOCUMENTATION (OUTPATIENT)
Dept: OBGYN CLINIC | Facility: CLINIC | Age: 65
End: 2025-06-11

## 2025-06-11 DIAGNOSIS — R92.8 OTHER ABNORMAL AND INCONCLUSIVE FINDINGS ON DIAGNOSTIC IMAGING OF BREAST: ICD-10-CM

## 2025-06-11 PROCEDURE — 77066 DX MAMMO INCL CAD BI: CPT

## 2025-06-11 PROCEDURE — 76642 ULTRASOUND BREAST LIMITED: CPT

## 2025-06-11 PROCEDURE — G0279 TOMOSYNTHESIS, MAMMO: HCPCS

## 2025-06-11 RX ORDER — ALENDRONATE SODIUM 70 MG/1
TABLET ORAL
COMMUNITY
Start: 2025-06-10

## 2025-06-13 ENCOUNTER — HOSPITAL ENCOUNTER (OUTPATIENT)
Dept: RADIOLOGY | Facility: CLINIC | Age: 65
End: 2025-06-13
Attending: ANESTHESIOLOGY
Payer: COMMERCIAL

## 2025-06-13 ENCOUNTER — CONSULT (OUTPATIENT)
Dept: PAIN MEDICINE | Facility: CLINIC | Age: 65
End: 2025-06-13
Payer: COMMERCIAL

## 2025-06-13 VITALS — HEIGHT: 65 IN | BODY MASS INDEX: 33.02 KG/M2 | WEIGHT: 198.2 LBS

## 2025-06-13 DIAGNOSIS — M47.26 OTHER SPONDYLOSIS WITH RADICULOPATHY, LUMBAR REGION: Primary | ICD-10-CM

## 2025-06-13 DIAGNOSIS — M47.26 OTHER SPONDYLOSIS WITH RADICULOPATHY, LUMBAR REGION: ICD-10-CM

## 2025-06-13 PROCEDURE — 99406 BEHAV CHNG SMOKING 3-10 MIN: CPT | Performed by: ANESTHESIOLOGY

## 2025-06-13 PROCEDURE — 72100 X-RAY EXAM L-S SPINE 2/3 VWS: CPT

## 2025-06-13 PROCEDURE — 99204 OFFICE O/P NEW MOD 45 MIN: CPT | Performed by: ANESTHESIOLOGY

## 2025-06-13 RX ORDER — GABAPENTIN 300 MG/1
300 CAPSULE ORAL 3 TIMES DAILY
Qty: 90 CAPSULE | Refills: 2 | Status: SHIPPED | OUTPATIENT
Start: 2025-06-13

## 2025-06-14 NOTE — PROGRESS NOTES
Name: Yakelin Earl      : 1960      MRN: [id number]  Encounter Provider: Garrett Linn MD  Encounter Date: 2025   Encounter [facility] [place]  :  Assessment & Plan  Other spondylosis with radiculopathy, lumbar region    Orders:    gabapentin (NEURONTIN) 300 mg capsule; Take 1 capsule (300 mg total) by mouth 3 (three) times a day    XR spine lumbar 2 or 3 views injury; Future    Ambulatory referral to Physical Therapy; Future    Left-sided lumbar radicular pain in the L5 and S1 dermatomal distribution accompanied by pain limited weakness numbness and paresthesias.  Patient has not participated with PT. Subacute pain with decreased participation with IADLs over the past few months.  Has been taking NSAIDs and tylenol with modest benefit.  5/5 strength bilaterally, positive SLR left-sided. Reflexes 2+.  Additionally there is positive facet loading, left greater than right. Denies any gait instability, saddle anesthesia. On xray imaging moderate degenerative disc disease with spondyloarthropathy in facet joints most prominently seen in lower lumbar levels.  Risks, benefits alternatives epidural steroid injections thoroughly discussed with patient.  Handouts provided questions answered to patient's satisfaction.  Lifestyle modifications extensively discussed including diet, exercise and weight loss in addition to core strengthening and smoking cessation for at least 3 mins.  Will proceed with multimodal pain therapy plan as noted below:    -f/u after 6 weeks PT to consider MRI lumbar spine  -gabapentin 300 mg t.i.d. Ordered for patient; counseled regarding sedative effects of taking this medication and provided up titration calendar.  Counseled not to take medication while driving or operating heavy machinery/using stairs  -script provided for formal physical therapy for lumbar radiculopathy; Physician directed home exercise plan as per [employer] demonstrated  and handouts provided that patient plans to participate with for 1 hour, twice a week for the next 6 weeks.       Complete risks and benefits including bleeding, infection, tissue reaction, nerve injury and allergic reaction were discussed. The approach was demonstrated using models and literature was provided. Verbal and written consent was obtained.    My impressions and treatment recommendations were discussed in detail with the patient who verbalized understanding and had no further questions.  Discharge instructions were provided. I personally saw and examined the patient and I agree with the above discussed plan of care.    History of Present Illness     Yakelin Earl is a 64 y.o. female with pmhx of COPD (1.5 PPD smoker), XOL, GERD, HTN, osteoporosis, obesity presenting with subacute lumbar radicular pain in the left L5 and S1 dermatomal distribution. Debilitating pain limited weakness numbness and paresthesias accompany the pain. The patient rates the pain at a 8/10 accompanied by electric shock-like shooting features and crampy burning pain in the aforementioned dermatomal distribution.  The pain is worse in the mornings as well as the end of the day; exertion such as walking for long periods of time seems to exacerbate the pain.  The patient can hardly walk more than a few blocks without having debilitating pain.  She tries to maintain an active lifestyle and finds that the current degree of pain seems to compromises her efforts.  The pain significantly impacts independent activities of daily living and contributes to significant disability.  She has not yet attempted formal physical therapy.  She has taken nsaids, tylenol  with limited relief of the pain as well. She has never tried epidural steroid injections in the past. She denies any bowel or bladder dysfunction/incontinence, saddle anesthesia or gait instability.      Review of Systems   Constitutional:  Positive for activity change.   HENT:  "Negative.     Eyes: Negative.    Respiratory: Negative.     Cardiovascular: Negative.    Gastrointestinal: Negative.    Endocrine: Negative.    Genitourinary: Negative.    Musculoskeletal:  Positive for arthralgias, back pain, gait problem and myalgias.   Skin: Negative.    Allergic/Immunologic: Negative.    Neurological:  Positive for weakness and numbness.   Hematological: Negative.    Psychiatric/Behavioral: Negative.     All other systems reviewed and are negative.    Medical History Reviewed by provider this encounter:  Tobacco  Allergies  Meds  Problems  Med Hx  Surg Hx  Fam Hx     .    Objective   Ht 5' 4.75\" (1.645 m)   Wt 89.9 kg (198 lb 3.2 oz)   BMI 33.24 kg/m²         Physical Exam    Constitutional: normal, well developed, well nourished, alert, in no distress and non-toxic and no overt pain behavior. obese  Eyes: anicteric  HEENT: grossly intact  Neck: supple, symmetric, trachea midline and no masses   Pulmonary:even and unlabored  Cardiovascular:No edema or pitting edema present  Skin:Normal without rashes or lesions and well hydrated  Psychiatric:Mood and affect appropriate  Neurologic:Cranial Nerves II-XII grossly intact Sensation grossly intact; no clonus negative lux's. Reflexes 2+ and brisk. SLR positive left sided  Musculoskeletal:normal gait. 5/5 strength bilaterally with AROM in lower extremities. Difficulty with normal heel toe and tip toe walking. Significant pain with lumbar facet loading bilaterally and with lateral spine rotation, ttp over lumbar paraspinal muscles, left greater than right. Negative jerome's test, negative gaenslen's negative SIJ loading bilaterally.      Radiology Results Review: I personally reviewed the following image studies in PACS and associated radiology reports: xray(s). My interpretation of the radiology images/reports is: lumbar spondylosis, disc degeneration in lower lumbar spine that is age related.    Administrative Statements   I have spent a " total time of 30 minutes in caring for this patient on the day of the visit/encounter including Diagnostic results, Prognosis, Risks and benefits of tx options, Instructions for management, Patient and family education, Importance of tx compliance, Risk factor reductions, Impressions, Counseling / Coordination of care, Documenting in the medical record, Reviewing/placing orders in the medical record (including tests, medications, and/or procedures), Obtaining or reviewing history  , and Communicating with other healthcare professionals .

## 2025-06-14 NOTE — PATIENT INSTRUCTIONS
Patient Education     Gabapentin (GA ba pen tin)   Brand Names: US Gralise; Neurontin   Brand Names: Vitalyi AG-Gabapentin; APO-Gabapentin; Auro-Gabapentin; BIO-Gabapentin [DSC]; DOM-Gabapentin; GD-Gabapentin [DSC]; GLN-Gabapentin; JAMP-Gabapentin; Mar-Gabapentin [DSC]; MINT-Gabapentin; Neurontin; PMS-Gabapentin; Priva-Gabapentin [DSC]; PRO-Gabapentin; RAN-Gabapentin [DSC]; JUAN JOSE-Gabapentin; TARO-Gabapentin [DSC]; TEVA-Gabapentin   What is this drug used for?   It is used to treat painful nerve diseases.  It is used to help control certain kinds of seizures.  It may be given to you for other reasons. Talk with the doctor.  What do I need to tell my doctor BEFORE I take this drug?   If you are allergic to this drug; any part of this drug; or any other drugs, foods, or substances. Tell your doctor about the allergy and what signs you had.  If you have kidney disease or are on dialysis.  This is not a list of all drugs or health problems that interact with this drug.  Tell your doctor and pharmacist about all of your drugs (prescription or OTC, natural products, vitamins) and health problems. You must check to make sure that it is safe for you to take this drug with all of your drugs and health problems. Do not start, stop, or change the dose of any drug without checking with your doctor.  What are some things I need to know or do while I take this drug?   For all uses of this drug:   Tell all of your health care providers that you take this drug. This includes your doctors, nurses, pharmacists, and dentists.  Avoid driving and doing other tasks or actions that call for you to be alert until you see how this drug affects you.  This drug may affect certain lab tests. Tell all of your health care providers and lab workers that you take this drug.  Have blood work checked as you have been told by the doctor. Talk with the doctor.  Talk with your doctor before you use alcohol, marijuana or other forms of cannabis, or  prescription or OTC drugs that may slow your actions.  This drug is not the same as gabapentin enacarbil (Horizant). Do not use in its place. Talk with the doctor.  Do not stop taking this drug all of a sudden without calling your doctor. You may have a greater risk of side effects. If you need to stop this drug, you will want to slowly stop it as ordered by your doctor.  A severe and sometimes deadly reaction has happened. Most of the time, this reaction has signs like fever, rash, or swollen glands with problems in body organs like the liver, kidney, blood, heart, muscles and joints, or lungs. If you have questions, talk with the doctor.  Severe breathing problems have happened with this drug in people taking certain other drugs (like opioid pain drugs). This has also happened in people who already have lung or breathing problems. The risk may also be greater in people who are older than 65. Sometimes, breathing problems have been deadly. If you have questions, talk with the doctor.  If you are 65 or older, use this drug with care. You could have more side effects.  If the patient is 3 to 12 years of age, use this drug with care. The risk of mood or behavior problems may be higher in these children.  Tell your doctor if you are pregnant, plan on getting pregnant, or are breast-feeding. You will need to talk about the benefits and risks to you and the baby.  For seizures:   If seizures are different or worse after starting this drug, talk with the doctor.  What are some side effects that I need to call my doctor about right away?   WARNING/CAUTION: Even though it may be rare, some people may have very bad and sometimes deadly side effects when taking a drug. Tell your doctor or get medical help right away if you have any of the following signs or symptoms that may be related to a very bad side effect:  Signs of an allergic reaction, like rash; hives; itching; red, swollen, blistered, or peeling skin with or without  fever; wheezing; tightness in the chest or throat; trouble breathing, swallowing, or talking; unusual hoarseness; or swelling of the mouth, face, lips, tongue, or throat.  Signs of liver problems like dark urine, tiredness, decreased appetite, upset stomach or stomach pain, light-colored stools, throwing up, or yellow skin or eyes.  Signs of kidney problems like unable to pass urine, change in how much urine is passed, blood in the urine, or a big weight gain.  Trouble controlling body movements, twitching, change in balance, trouble swallowing or speaking.  Memory problems or loss.  Change in eyesight.  Not able to control eye movements.  Feeling confused, not able to focus, or change in behavior.  Shakiness.  Trouble breathing, slow breathing, or shallow breathing.  Blue or gray color of the skin, lips, nail beds, fingers, or toes.  Swelling in the arms or legs.  Severe dizziness or passing out.  Fever, chills, or sore throat; any unexplained bruising or bleeding; or feeling very tired or weak.  Muscle pain or weakness.  Get medical help right away if you feel very sleepy, very dizzy, or if you pass out. Caregivers or others need to get medical help right away if the patient does not respond, does not answer or react like normal, or will not wake up.  Like other drugs that may be used for seizures, this drug may rarely raise the risk of suicidal thoughts or actions. The risk may be higher in people who have had suicidal thoughts or actions in the past. Call the doctor right away about any new or worse signs like depression; feeling nervous, restless, or grouchy; panic attacks; or other changes in mood or behavior. Call the doctor right away if any suicidal thoughts or actions occur.  What are some other side effects of this drug?   All drugs may cause side effects. However, many people have no side effects or only have minor side effects. Call your doctor or get medical help if any of these side effects or any  other side effects bother you or do not go away:  Feeling dizzy, sleepy, tired, or weak.  Diarrhea, upset stomach, or throwing up.  Dry mouth.  These are not all of the side effects that may occur. If you have questions about side effects, call your doctor. Call your doctor for medical advice about side effects.  You may report side effects to your national health agency.  You may report side effects to the FDA at 1-332.146.3902. You may also report side effects at https://www.fda.gov/medwatch.  How is this drug best taken?   Use this drug as ordered by your doctor. Read all information given to you. Follow all instructions closely.  All products:   Keep taking this drug as you have been told by your doctor or other health care provider, even if you feel well.  If you are taking an antacid that has aluminum or magnesium in it, take this drug at least 2 hours after taking the antacid.  Gralise:   Take with the evening meal.  Swallow whole. Do not chew, break, or crush.  All other products:   Take with or without food.  Capsules:   Swallow whole with a full glass of water.  Tablets:   You may break the tablet in half. Do not chew or crush.  If you break the tablet in half, use the other half of the tablet for the next dose, as told by the doctor. Throw away half-tablets not used within 28 days.  Liquid (solution):   Measure liquid doses carefully. Use the measuring device that comes with this drug. If there is none, ask the pharmacist for a device to measure this drug.  What do I do if I miss a dose?   Take a missed dose as soon as you think about it.  If it is close to the time for your next dose, skip the missed dose and go back to your normal time.  Do not take 2 doses at the same time or extra doses.  How do I store and/or throw out this drug?   Liquid (solution):   Store in a refrigerator. Do not freeze.  All other products:   Store at room temperature in a dry place. Do not store in a bathroom.  All dose forms:    Keep all drugs in a safe place. Keep all drugs out of the reach of children and pets.  Throw away unused or  drugs. Do not flush down a toilet or pour down a drain unless you are told to do so. Check with your pharmacist if you have questions about the best way to throw out drugs. There may be drug take-back programs in your area.  General drug facts   If your symptoms or health problems do not get better or if they become worse, call your doctor.  Do not share your drugs with others and do not take anyone else's drugs.  Some drugs may have another patient information leaflet. If you have any questions about this drug, please talk with your doctor, nurse, pharmacist, or other health care provider.  Some drugs may have another patient information leaflet. Check with your pharmacist. If you have any questions about this drug, please talk with your doctor, nurse, pharmacist, or other health care provider.  If you think there has been an overdose, call your poison control center or get medical care right away. Be ready to tell or show what was taken, how much, and when it happened.  Consumer Information Use and Disclaimer   This generalized information is a limited summary of diagnosis, treatment, and/or medication information. It is not meant to be comprehensive and should be used as a tool to help the user understand and/or assess potential diagnostic and treatment options. It does NOT include all information about conditions, treatments, medications, side effects, or risks that may apply to a specific patient. It is not intended to be medical advice or a substitute for the medical advice, diagnosis, or treatment of a health care provider based on the health care provider's examination and assessment of a patient's specific and unique circumstances. Patients must speak with a health care provider for complete information about their health, medical questions, and treatment options, including any risks or  "benefits regarding use of medications. This information does not endorse any treatments or medications as safe, effective, or approved for treating a specific patient. UpToDate, Inc. and its affiliates disclaim any warranty or liability relating to this information or the use thereof. The use of this information is governed by the Terms of Use, available at https://www.[employer].com/en/know/clinical-effectiveness-terms.  Last Reviewed Date   2023-05-09  Copyright   © 2024 UpToDate, Inc. and its affiliates and/or licensors. All rights reserved.  Patient Education     Back exercises   The Basics   Written by the doctors and editors at [employer]   Why do I need to do back exercises? -- Back exercises can help ease back pain and might help prevent future back pain. Long term, it is important to strengthen the muscles in your lower back, buttocks, and belly. These are your \"core muscles.\" Stretching exercises are also important to keep your muscles flexible.  Below are some stretching and strengthening exercises that might help you. Other forms of movement can help ease or prevent back pain, too. For example, some people like to walk, do aerobic exercise, or do yoga or christian chi. The most important thing is to move your body. Your doctor, nurse, or physical therapist can help you find different types of activity that work for you.  What stretching exercises should I do? -- Below are some examples of stretching exercises. Warm up your muscles before stretching to help prevent injury. To warm up, you can walk, jog, or cycle for a few minutes.  Start by repeating each of these stretches 2 to 3 times. Try to hold each stretch for 5 to 10 seconds, and try to do the stretches 2 to 3 times each day. Breathe slowly and deeply as you do the exercises. Never bounce when doing stretches.   Cat-cow stretch (figure 1) - Start on all fours on the floor, with your hands under your shoulders, knees under your hips, and your back flat. " "First, tuck your chin and tighten your stomach muscles as you round your back (like a \"cat\"). Hold the stretch for about 10 seconds. Rest for a few seconds as you flatten your back. Next, lift your chin and let your belly and lower back sink toward the floor (like a \"cow\"). Hold the stretch for about 10 seconds.   Single knee-to-chest stretches (figure 2) ? While lying on your back, bend your knees with your feet flat on the floor. Pull 1 knee toward your chest until you feel a stretch in your lower back and buttock area. Lower, and repeat with the other knee. If you have knee problems, pull your knee up by grabbing the back of your thigh instead of the front of your knee. You can also do this exercise by grabbing both knees at the same time.   Lower trunk rotations (figure 3) ? While lying on your back, bend your knees with your feet flat on the floor. Keep your knees and ankles together, and then drop them to 1 side. Keep both of your shoulders touching the floor until you feel a stretch in the muscles at the side of the back. Repeat on the other side.   Lower back stretches seated (figure 4) ? Sit in a chair with your feet spread about shoulder width apart. Then, lean forward until you feel a stretch in your lower back.  What strengthening exercises should I do? -- Below are some examples of strengthening exercises.  Start by doing each exercise 2 to 3 times. Work up to doing each exercise 10 times. Hold each exercise for 3 to 5 seconds, and try to do the exercises 2 to 3 times each day. Do all exercises slowly.   Shoulder blade squeezes (figure 5) ? Pinch your shoulder blades together on your upper back, and hold 3 to 5 seconds. You can also do these 1 side at a time. Sit with good posture, and make sure that your shoulders do not rise up when you do this exercise. Relax.   Pelvic tilts (figure 6) ? Lie on your back with your knees bent and feet flat on the floor. Tighten your stomach muscles, and press your " "lower back down to the floor. Relax. You should be able to breathe comfortably during this exercise.   Hip lifts (figure 7) ? Lie on your back with your knees bent and feet flat on the floor. Tighten your stomach muscles, keep your back flat, and lift your buttocks off of the floor. Relax. You should feel this in your buttocks, not in your lower back.  What else should I know?    Exercise, including stretching, might be slightly uncomfortable. But you should not have sharp or severe pain. If you do get severe pain, stop what you are doing. If severe pain continues, call your doctor or nurse.   Do not hold your breath when exercising. If you tend to hold your breath, try counting out loud when exercising. If any exercise bothers you, stop right away.   Always warm up before exercising. Warm muscles stretch much easier than cool muscles. Stretching cool muscles can lead to injury.   Doing exercises before a meal can be a good way to get into a routine.  All topics are updated as new evidence becomes available and our peer review process is complete.  This topic retrieved from [employer] on: Apr 03, 2024.  Topic 505035 Version 2.0  Release: 32.2.4 - C32.92  © 2024 UpToDate, Inc. and/or its affiliates. All rights reserved.  figure 1: Cat-cow stretch     Start on all fours on the floor, with hands under your shoulders, knees under your hips, and your back flat. First, tuck your chin and tighten your stomach muscles as you round your back (like a \"cat\"). Hold the stretch for about 10 seconds. Rest for a few seconds as you flatten your back. Next, lift your chin and let your belly and lower back sink toward the floor (like a \"cow\"). Hold the stretch for about 10 seconds.  Graphic [id number] Version 1.0  figure 2: Single knee-to-chest stretch     Lie on your back, bend your knees, and have your feet flat on the floor. Pull 1 knee toward your chest until you feel a stretch in your lower back and buttock area. Repeat with the other " knee. If you have knee problems, pull your knee up by grabbing the back of your thigh instead of the front of your knee. You can also do this exercise by grabbing both knees at the same time.  Graphic [id number] Version 1.0  figure 3: Lower trunk rotation     While lying on your back, bend your knees and have your feet flat on the floor. Keep your legs together and then drop them to 1 side. Keep both of your shoulders touching the floor until you feel a stretch in the muscles at the side of the back. Repeat on the other side.  Graphic [id number] Version 1.0  figure 4: Lower back stretch     Sit in a chair with your feet spread about shoulder width apart. Then, lean forward until you feel a stretch in your lower back.  Graphic [id number] Version 1.0  figure 5: Shoulder blade squeezes     Pinch your shoulder blades together on your upper back and hold for 3 to 5 seconds. Make sure that you are sitting with good posture and that your shoulders do not raise up when you do this exercise. Relax.  Graphic [id number] Version 1.0  figure 6: Pelvic tilts     Lie on your back with your knees bent and feet flat on the floor. Tighten your stomach muscles and press your lower back down to the floor. Relax.  Graphic [id number] Version 1.0  figure 7: Hip lifts     Lie on your back with your knees bent and feet flat on the floor. Tighten your stomach muscles and lift your buttocks off of the floor. Relax.  Graphic [id number] Version 1.0  Consumer Information Use and Disclaimer   Disclaimer: This generalized information is a limited summary of diagnosis, treatment, and/or medication information. It is not meant to be comprehensive and should be used as a tool to help the user understand and/or assess potential diagnostic and treatment options. It does NOT include all information about conditions, treatments, medications, side effects, or risks that may apply to a specific patient. It is not intended to be medical advice or a substitute for the medical  advice, diagnosis, or treatment of a health care provider based on the health care provider's examination and assessment of a patient's specific and unique circumstances. Patients must speak with a health care provider for complete information about their health, medical questions, and treatment options, including any risks or benefits regarding use of medications. This information does not endorse any treatments or medications as safe, effective, or approved for treating a specific patient. UpToDate, Inc. and its affiliates disclaim any warranty or liability relating to this information or the use thereof.The use of this information is governed by the Terms of Use, available at https://www.[employer].com/en/know/clinical-effectiveness-terms. 2024© UpToDate, Inc. and its affiliates and/or licensors. All rights reserved.  Copyright   © 2024 UpToDate, Inc. and/or its affiliates. All rights reserved.

## 2025-06-16 ENCOUNTER — TELEPHONE (OUTPATIENT)
Dept: RADIOLOGY | Facility: CLINIC | Age: 65
End: 2025-06-16

## 2025-06-16 NOTE — TELEPHONE ENCOUNTER
Caller: Yakelin    Doctor: Dr. Linn    Reason for call: pt retuning call from nurse    Call back#: [phone]

## 2025-06-16 NOTE — TELEPHONE ENCOUNTER
LMOM for pt to cb. CB# and OH provided.    Garrett Linn MD  [facility]  Please relay  xray lumbar spine findings to patient; should f/u after completion of PT to guide interventional treatment options, thanks

## 2025-06-30 ENCOUNTER — TELEPHONE (OUTPATIENT)
Age: 65
End: 2025-06-30

## 2025-06-30 DIAGNOSIS — M79.672 LEFT FOOT PAIN: Primary | ICD-10-CM

## 2025-06-30 RX ORDER — MELOXICAM 7.5 MG/1
7.5 TABLET ORAL DAILY
Qty: 10 TABLET | Refills: 0 | Status: SHIPPED | OUTPATIENT
Start: 2025-06-30

## 2025-06-30 NOTE — TELEPHONE ENCOUNTER
Called pt she is aware that someone will  be calling to get her a sooner appointment. She stated that she will go to the ED if she can't stand the pain. She did take the gabapentin for 2 weeks with no change. She said she was doing fine with the boot for 2 weeks and then took it off as instructed. She went to work last weekend and the pain and swelling got much worse. She has an appointment with PT on 7/9/25.

## 2025-06-30 NOTE — TELEPHONE ENCOUNTER
Caller: Patient- Yakelin    Doctor: Dr. Sierra    Reason for call: Patient is calling in stating that she had sent a [employer] message to the Dr on Friday 6/27 and has not heard anything back. She is stating that she is still having pain in her left foot/ankle as the pain is not going away. Patient is not sure as to what further she can do as she did wear the boot for 2 weeks as she took the boot off and now cannot even place any weight on the foot. When she does walk on it is very painful for her she is asking what further she can do, please advise and reach out to patient relating this.      Call back#: [phone]

## 2025-06-30 NOTE — TELEPHONE ENCOUNTER
Spoke to Yakelin. She will call PT today but states that she feels like something else is wrong. She cannot walk without the boot and the pain is worsening. She is not taking gabapentin as it did not help. She will  the Meloxicam. She states that there was no new injury just worsening pain. She also said that she sees her PCP tomorrow.

## 2025-06-30 NOTE — TELEPHONE ENCOUNTER
Caller: Patient     Doctor/Office: Dr Sierra     Call regarding :  Patient calling to speak to POD office      Call was transferred to: POD

## 2025-07-01 NOTE — TELEPHONE ENCOUNTER
Called and spoke to patient I relayed Dr. Sierra's message to follow up patient is already scheduled 7/22/25 and will keep this appointment.

## 2025-07-03 ENCOUNTER — APPOINTMENT (OUTPATIENT)
Dept: RADIOLOGY | Facility: CLINIC | Age: 65
End: 2025-07-03
Attending: PHYSICIAN ASSISTANT
Payer: COMMERCIAL

## 2025-07-03 ENCOUNTER — OFFICE VISIT (OUTPATIENT)
Dept: URGENT CARE | Facility: CLINIC | Age: 65
End: 2025-07-03
Payer: COMMERCIAL

## 2025-07-03 VITALS
SYSTOLIC BLOOD PRESSURE: 130 MMHG | TEMPERATURE: 97.4 F | WEIGHT: 198 LBS | OXYGEN SATURATION: 95 % | HEIGHT: 64 IN | DIASTOLIC BLOOD PRESSURE: 84 MMHG | RESPIRATION RATE: 18 BRPM | BODY MASS INDEX: 33.8 KG/M2 | HEART RATE: 60 BPM

## 2025-07-03 DIAGNOSIS — M25.572 ACUTE LEFT ANKLE PAIN: Primary | ICD-10-CM

## 2025-07-03 DIAGNOSIS — M25.572 ACUTE LEFT ANKLE PAIN: ICD-10-CM

## 2025-07-03 PROCEDURE — 73610 X-RAY EXAM OF ANKLE: CPT

## 2025-07-03 PROCEDURE — 99213 OFFICE O/P EST LOW 20 MIN: CPT | Performed by: PHYSICIAN ASSISTANT

## 2025-07-03 NOTE — LETTER
July 3, 2025     Patient: Yakelin Earl   YOB: 1960   Date of Visit: 7/3/2025       To Whom it May Concern:    Yakelin Earl was seen in my clinic on 7/3/2025. Please excuse patient from work on 7/7-7/9/25.    If you have any questions or concerns, please don't hesitate to call.         Sincerely,          Dora Ernst PA-C        CC: No Recipients

## 2025-07-03 NOTE — PATIENT INSTRUCTIONS
Continue with meloxicam as prescribed  1000mg of tylenol every 8 hours as needed for pain  Elevate and rest when able  Apply ice 10-20 minutes at a time at least 3 times a day  Follow up with orthopedist

## 2025-07-03 NOTE — PROGRESS NOTES
[facility]        NAME: Yakelin Earl is a 64 y.o. female  : 1960    MRN: [id number]  DATE: July 3, 2025  TIME: 8:21 AM    Assessment and Plan   Acute left ankle pain [M25.572]  1. Acute left ankle pain  XR ankle 3+ vw left            Patient Instructions     Continue with meloxicam as prescribed  1000mg of tylenol every 8 hours as needed for pain  Elevate and rest when able  Apply ice 10-20 minutes at a time at least 3 times a day  Follow up with orthopedist  Follow up with PCP in 3-5 days.  Proceed to  ER if symptoms worsen.    If tests have been performed at [facility], our office will contact you with results if changes need to be made to the care plan discussed with you at the visit.  You can review your full results on [facility] MyChart.    Chief Complaint     Chief Complaint   Patient presents with    Foot Pain    Ankle Pain     Pt c/o left foot and ankle pain that began 3 weeks ago. Denies injury. Pt states she fractured her foot in January and wore the boot then was free of pain when it now returned. Pt began wearing the boot again to relieve pain but has no relief.  Mild edema noted to left ankle.         History of Present Illness       64-year-old female with osteoporosis presents complaining of left ankle pain and swelling x 3.5 weeks.  Patient reports that she had a stress fracture to her left cuboid in January and reports she is under the care of an orthopedist for this injury.  And then without injury approximately 3 and half weeks ago left ankle just inferior to the lateral malleolus began aching.  She reports pain is severe and made worse by ambulation.  She has been wearing her walking boot.  She is taking meloxicam and Tylenol for the pain.  She denies numbness weakness loss of motion redness fever.  She is employed as a  and reports that her work is exacerbating the pain.        Review of Systems   Review of Systems   Constitutional:  Negative for fever.  "  Musculoskeletal:  Positive for arthralgias, gait problem and joint swelling. Negative for myalgias.   Skin:  Negative for wound.   Neurological:  Negative for weakness.         Current Medications     Current Medications[1]    Current Allergies     Allergies as of 07/03/2025    (No Known Allergies)            The following portions of the patient's history were reviewed and updated as appropriate: allergies, current medications, past family history, past medical history, past social history, past surgical history and problem list.     Past Medical History[2]    Past Surgical History[3]    Family History[4]      Medications have been verified.        Objective   /84   Pulse 60   Temp (!) 97.4 °F (36.3 °C)   Resp 18   Ht 5' 4\" (1.626 m)   Wt 89.8 kg (198 lb)   SpO2 95%   BMI 33.99 kg/m²   No LMP recorded. Patient has had a hysterectomy.       Physical Exam     Physical Exam  Constitutional:       Appearance: Normal appearance.     Cardiovascular:      Rate and Rhythm: Normal rate and regular rhythm.   Pulmonary:      Effort: Pulmonary effort is normal. No respiratory distress.     Musculoskeletal:      Left ankle: No swelling or ecchymosis. Tenderness present over the CF ligament and posterior TF ligament. No lateral malleolus, medial malleolus, ATF ligament, AITF ligament, base of 5th metatarsal or proximal fibula tenderness. Normal range of motion.      Left Achilles Tendon: Normal.     Neurological:      Mental Status: She is alert.                        [1]   Current Outpatient Medications:     albuterol (PROVENTIL HFA,VENTOLIN HFA) 90 mcg/act inhaler, , Disp: , Rfl:     alendronate (FOSAMAX) 70 mg tablet, , Disp: , Rfl:     aspirin (ECOTRIN LOW STRENGTH) 81 mg EC tablet, Take 81 mg by mouth in the morning., Disp: , Rfl:     atenolol (TENORMIN) 50 mg tablet, Take 50 mg by mouth in the morning., Disp: , Rfl:     docusate sodium (COLACE) 100 mg capsule, , Disp: , Rfl:     gabapentin (NEURONTIN) 300 " mg capsule, Take 1 capsule (300 mg total) by mouth 3 (three) times a day, Disp: 90 capsule, Rfl: 2    hydroCHLOROthiazide 25 mg tablet, , Disp: , Rfl:     losartan (COZAAR) 100 MG tablet, , Disp: , Rfl:     meloxicam (Mobic) 7.5 mg tablet, Take 1 tablet (7.5 mg total) by mouth daily, Disp: 10 tablet, Rfl: 0    omeprazole (PriLOSEC) 20 mg delayed release capsule, , Disp: , Rfl:     rosuvastatin (CRESTOR) 20 MG tablet, Take 20 mg by mouth every morning, Disp: , Rfl:     Trelegy Ellipta 100-62.5-25 MCG/ACT inhaler, , Disp: , Rfl:   [2]   Past Medical History:  Diagnosis Date    Breast cyst    [3]   Past Surgical History:  Procedure Laterality Date    HYSTERECTOMY      age 48    OOPHORECTOMY Bilateral     age 48   [4]   Family History  Problem Relation Name Age of Onset    Lung cancer Mother  74    Leukemia Mother  70    No Known Problems Father      No Known Problems Sister      No Known Problems Sister      Cancer Maternal Grandmother      No Known Problems Maternal Grandfather      No Known Problems Paternal Grandmother      No Known Problems Paternal Grandfather      No Known Problems Maternal Aunt      No Known Problems Maternal Aunt      No Known Problems Maternal Aunt      No Known Problems Maternal Aunt

## 2025-07-08 NOTE — PROGRESS NOTES
PT Evaluation     Today's date: 2025  Patient name: Yakelin Earl  : 1960  MRN: [id number]  Referring provider: Jeri Sierra DPM  Dx:   Encounter Diagnosis     ICD-10-CM    1. Closed nondisplaced fracture of cuboid of left foot with routine healing, subsequent encounter  S92.215D Ambulatory referral to Physical Therapy      2. Pain of left heel  M79.672 Ambulatory referral to Physical Therapy                     Assessment  Impairments: abnormal gait, abnormal or restricted ROM, activity intolerance, impaired balance, impaired physical strength, lacks appropriate home exercise program, pain with function, weight-bearing intolerance, unable to perform ADL and activity limitations    Assessment details: PT notes the patient with decrease ROM and strength t/o the left foot and ankle with demonstration of impaired gait pattern and balance post cuboid fracture with with NWB and CAM boot for 8 weeks leading to increase pain levels and functional limitations with need for course of skilled therapy for 6 weeks with focus on gait/balance, strengthening, manual therapy, analgesic modalities, and HEP.  PT notes accomodation of Dancer's Pad was put into the sneaker of the left foot secondary to + MT squeeze sign and PT will RA next session of patient response to the pad. PT notes the patient with orders for custom orthotics so clinic will run the insurance to see if a covered benefit for the patient.    Understanding of Dx/Px/POC: good     Prognosis: good    Goals  ST.  Initiate HEP  2.  Decrease pain levels by 25-50%   3.  Increase ROM by 25-50%   4.  Increase strength by 1-2 mm grades  LT.  Improve gait pattern and balance to good/normal level  2.  Decrease limitations with standing, walking and stairs  3.  Decrease limitations with ADL and transfers  4.  DC with HEP     Plan  Patient would benefit from: skilled physical therapy and PT eval  Planned modality interventions: cryotherapy    Planned  therapy interventions: manual therapy, neuromuscular re-education, patient/caregiver education, postural training, self care, strengthening, stretching, therapeutic exercise, balance, flexibility, gait training, graded exercise and home exercise program    Frequency: 2x week  Duration in weeks: 6  Treatment plan discussed with: patient  Plan details: PT notes review of POC and findings with the patient who is in agreement with PT recommendations of course of skilled therapy.          Subjective Evaluation    History of Present Illness  Mechanism of injury: Patient reports dealing with chronic left for several months with no specific TARUN.  Patient reports over time the symptoms have worsened leading to increase pain levels and functional limitations.  Patient is under the care of podiatrist who evaluated the patient and found + cuboid fracture with MRI so patient was NWB for 6 weeks and then transitioned to CAM boot for 4 weeks.  Patient is now referred to OPPT to address left foot and ankle pain.  Patient reports she has tried to transition to sneaker/shoe and reports development of lateral foot pain with difficulty with standing and walking activities. Patient is a FT  with no significant PMH.   Patient Goals  Patient goals for therapy: decreased pain, improved balance, increased motion, increased strength, independence with ADLs/IADLs, return to sport/leisure activities and return to work    Pain  Current pain ratin  At best pain ratin  At worst pain ratin  Location: Left Foot/Ankle  Quality: discomfort, dull ache, tight and pulling  Relieving factors: change in position and rest  Aggravating factors: stair climbing, walking and standing      Diagnostic Tests  MRI studies: normal  Treatments  Previous treatment: immobilization  Current treatment: physical therapy        Objective     Observations     Additional Observation Details  PT notes relative normal feet position with minimal drop of the  medial arch during stance     Palpation   Left   Muscle spasm in the peroneus.   Tenderness of the lateral gastrocnemius, medial gastrocnemius and peroneus.     Tenderness   Left Ankle/Foot   Tenderness in the fifth metatarsal base, first metatarsal head, peroneal tendon and plantar fascia.     Neurological Testing     Reflexes   Left   Patellar (L4): normal (2+)  Achilles (S1): normal (2+)    Right   Patellar (L4): normal (2+)  Achilles (S1): normal (2+)    Active Range of Motion   Left Knee   Normal active range of motion    Right Knee   Normal active range of motion  Left Ankle/Foot   Dorsiflexion (ke): 3 degrees with pain  Plantar flexion: 35 degrees   Inversion: 14 degrees with pain  Eversion: 11 degrees with pain    Additional Active Range of Motion Details  PT notes decrease ROM and strength t/o the left foot and ankle     Passive Range of Motion   Left Ankle/Foot    Dorsiflexion (ke): 5 degrees with pain  Plantar flexion: WFL  Inversion: WFL and with pain  Eversion: WFL and with pain    Joint Play   Left Ankle/Foot  Hypomobile in the distal tibiofibular joint, subtalar joint and midfoot.     Strength/Myotome Testing     Left Knee   Normal strength  Quadriceps contraction: good    Right Knee   Normal strength    Left Ankle/Foot   Dorsiflexion: 4-  Plantar flexion: 4  Inversion: 4  Eversion: 3+    Tests   Left Ankle/Foot   Positive for calcaneal squeeze, metatarsal squeeze and syndesmosis squeeze.     Swelling   Left Ankle/Foot   Metatarsal heads: 22 cm  Malleoli: 25 cm    Right Ankle/Foot   Metatarsal heads: 22 cm  Malleoli: 23 cm    Ambulation   Weight-Bearing Status   Weight-Bearing Status (Left): weight-bearing as tolerated     Observational Gait   Gait: antalgic   Decreased walking speed, stride length and left stance time.     Additional Observational Gait Details  PT notes MI with ambulation but decrease stance on the left, decrease tibial advancement, decrease step length and decrease jonh.               Precautions:  WBAT Left LE  POC 8/9/25      Manuals 7/9       Left foot and ankle mobs and stretching        Left Ankle MRE- All Directions                         Neuro Re-Ed        Rocker Board- Tandem F/B and S/S         Tandem on Foam with OH S/S         Tandem and Side Step Walk on Foam         BOSU March BOSU 3 Way SLR         Front and Retro Step Over        Foam Arch Lifts         Ther Ex        SRC for ROM and Strength         Front and Lateral Step Up         Seated Ankle I/E        Stand 1/2 Foam DF/PF and I/E         Ankle Circles         Towel Toe Curls                 HEP update/review  15 min        Ther Activity                        Gait Training                        Modalities        CP PRN

## 2025-07-09 ENCOUNTER — EVALUATION (OUTPATIENT)
Dept: PHYSICAL THERAPY | Facility: CLINIC | Age: 65
End: 2025-07-09
Payer: COMMERCIAL

## 2025-07-09 DIAGNOSIS — M79.672 PAIN OF LEFT HEEL: ICD-10-CM

## 2025-07-09 DIAGNOSIS — S92.215D CLOSED NONDISPLACED FRACTURE OF CUBOID OF LEFT FOOT WITH ROUTINE HEALING, SUBSEQUENT ENCOUNTER: ICD-10-CM

## 2025-07-09 PROCEDURE — 97535 SELF CARE MNGMENT TRAINING: CPT | Performed by: PHYSICAL THERAPIST

## 2025-07-09 PROCEDURE — 97162 PT EVAL MOD COMPLEX 30 MIN: CPT | Performed by: PHYSICAL THERAPIST

## 2025-07-14 ENCOUNTER — APPOINTMENT (OUTPATIENT)
Dept: PHYSICAL THERAPY | Facility: CLINIC | Age: 65
End: 2025-07-14
Attending: STUDENT IN AN ORGANIZED HEALTH CARE EDUCATION/TRAINING PROGRAM
Payer: COMMERCIAL

## 2025-07-14 DIAGNOSIS — S92.215D CLOSED NONDISPLACED FRACTURE OF CUBOID OF LEFT FOOT WITH ROUTINE HEALING, SUBSEQUENT ENCOUNTER: Primary | ICD-10-CM

## 2025-07-14 DIAGNOSIS — M79.672 PAIN OF LEFT HEEL: ICD-10-CM

## 2025-07-14 NOTE — PROGRESS NOTES
Daily Note     Today's date: 2025  Patient name: Yakelin Earl  : 1960  MRN: [id number]  Referring provider: Jeri Sierra DPM  Dx:   Encounter Diagnosis     ICD-10-CM    1. Closed nondisplaced fracture of cuboid of left foot with routine healing, subsequent encounter  S92.215D       2. Pain of left heel  M79.672                      Subjective: Patient states that her ankle was very sore/swollen after working the past few days.       Objective: See treatment diary below      Assessment: Tolerated treatment poor.  Evaluated by PT and PT reccommended D/C of sneaker and continuing to wear stabilizing CAM boot. PT also reccommended stopping work and contact MD about work note. PT found increased swelling and general tenderness t/o L ankle, which lead to above recommendations. PTA modified session today due to findings and will follow up next session and progress as tolerated.       Plan: Continue per plan of care.      Precautions:  WBAT Left LE  POC 25      Manuals       Left foot and ankle mobs and stretching  10 mins gentle stretching &PROM      Left Ankle MRE- All Directions                         Neuro Re-Ed        Rocker Board- Tandem F/B and S/S         Tandem on Foam with OH S/S         Tandem and Side Step Walk on Foam          3 Way SLR         Front and Retro Step Over        Foam Arch Lifts   15x      Ther Ex        SRC for ROM and Strength   Nu Step Lvl 1 10 mins      Front and Lateral Step Up         Seated Ankle I/E        Stand 1/2 Foam DF/PF and I/E         Ankle Circles   20x CW/CCW      Towel Toe Curls   20x              HEP update/review  15 min        Ther Activity                        Gait Training                        Modalities        CP PRN  10 mins

## 2025-07-16 ENCOUNTER — OFFICE VISIT (OUTPATIENT)
Dept: PHYSICAL THERAPY | Facility: CLINIC | Age: 65
End: 2025-07-16
Attending: STUDENT IN AN ORGANIZED HEALTH CARE EDUCATION/TRAINING PROGRAM
Payer: COMMERCIAL

## 2025-07-16 ENCOUNTER — OFFICE VISIT (OUTPATIENT)
Dept: PODIATRY | Age: 65
End: 2025-07-16
Payer: COMMERCIAL

## 2025-07-16 VITALS — BODY MASS INDEX: 33.8 KG/M2 | WEIGHT: 198 LBS | HEIGHT: 64 IN

## 2025-07-16 DIAGNOSIS — M54.42 CHRONIC LOW BACK PAIN WITH LEFT-SIDED SCIATICA, UNSPECIFIED BACK PAIN LATERALITY: ICD-10-CM

## 2025-07-16 DIAGNOSIS — S92.215K: ICD-10-CM

## 2025-07-16 DIAGNOSIS — S92.215D CLOSED NONDISPLACED FRACTURE OF CUBOID OF LEFT FOOT WITH ROUTINE HEALING, SUBSEQUENT ENCOUNTER: Primary | ICD-10-CM

## 2025-07-16 DIAGNOSIS — G89.29 CHRONIC LOW BACK PAIN WITH LEFT-SIDED SCIATICA, UNSPECIFIED BACK PAIN LATERALITY: ICD-10-CM

## 2025-07-16 DIAGNOSIS — M84.375A STRESS REACTION OF LEFT FOOT, INITIAL ENCOUNTER: ICD-10-CM

## 2025-07-16 DIAGNOSIS — M79.672 PAIN OF LEFT HEEL: ICD-10-CM

## 2025-07-16 DIAGNOSIS — M25.572 ACUTE LEFT ANKLE PAIN: Primary | ICD-10-CM

## 2025-07-16 PROCEDURE — 97140 MANUAL THERAPY 1/> REGIONS: CPT

## 2025-07-16 PROCEDURE — 99214 OFFICE O/P EST MOD 30 MIN: CPT | Performed by: STUDENT IN AN ORGANIZED HEALTH CARE EDUCATION/TRAINING PROGRAM

## 2025-07-16 PROCEDURE — 97110 THERAPEUTIC EXERCISES: CPT

## 2025-07-16 RX ORDER — METHYLPREDNISOLONE 4 MG/1
TABLET ORAL
Qty: 1 EACH | Refills: 0 | Status: SHIPPED | OUTPATIENT
Start: 2025-07-16

## 2025-07-16 NOTE — PROGRESS NOTES
"Name: Yakelin Earl      : 1960      MRN: [id number]  Encounter Provider: Jeri Sierra DPM  Encounter Date: 2025   Encounter department: [facility] [place]  :  Assessment & Plan  Acute left ankle pain  - New acute issue. Likely inflammatory due to over stress from transitioning out of boot and returning to work  - UC note from 7/3/25 reviewed. XR left ankle 7/3/25 3v reviewed: no acute abnormalities noted.    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 Had been NWB 6wks followed by WBAT in tall CAM boot for another 4wks  - Bone stim pending  - CMO's rx'd via [facility] last appt, pt to schedule  - PT notes reviewed   - c/w supportive shoes       Stress reaction of left foot, initial encounter  - New diag seen on MRI as below. Likely due to increased work duties in combination with osteoporosis and smoking  - Bone stim pending   - DXA scan as below. F/u with PCP for further medical mnmgt       Chronic low back pain with left-sided sciatica, unspecified back pain laterality  - Spine/pain consult from 25 reviewed        Prior Imaging  MRI left foot 5/15/25: \"Near complete resolution of previously noted nondisplaced subchondral fracture of the distal cuboid with only minimal patchy marrow edema remaining. Stress reaction of the lateral cuneiform, lateral navicular, anterior talus, bases of the fourth and fifth metatarsals, second metatarsal head, and medial hallux sesamoid. Small incomplete stress fracture of the fourth metatarsal head. Moderate midfoot arthropathy. Mild plantar fascial fibromatosis.\"  DXA scan 5/15/25: " "+ osteoporosis  XR left foot WB 3v 4/2/25: stable XR attn cuboid. + midfoot arthritic changes. Plantar calcaneal spur. Slightly elevated distal 1st met  MRI left foot 1/22/25: Subchondral slightly impacted fracture at the distal aspect of the cuboid with prominent reactive marrow edema. Degenerative change across the medial aspect of the navicular and first cuneiform with subcortical cystic changes and reactive marrow edema noted. Additional midfoot degenerative changes also appreciated.       History of Present Illness   HPI  Yakelin Earl is a 64 y.o. female who presents to clinic for left foot f/u. Notes no pain to foot but after returning to work her ankle has become painful and swollen. CAM boot, meloxicam, Tylenol and gabapentin only help minimally. Wearing sneaker today and has pain with ambulation. - N/V/C/F/SOB.     \"Notes foot pain doing better but new anterior ankle/dorsal foot is acting up. Got MRI and DXA scan. PCP yet to f/u about DXA. Wearing [place] sneakers. Has pain mngmt appt this Friday. Taking gabapentin with some improvement.\"     \"Notes pain on and off, worsening over the past weeks due to standing on feet more at work. Notes swelling.   Has upcoming appt with pain mngmt for back.   Did not got to PT.\"     \"Has been WB tall CAM boot. Notes intermittent tenderness to foot but feels good in CAM boot.   + Pain to plantar left foot. + lower back pain with numbness to LE/foot.\"     \"Notes pain has resolved. Had been NWB until last week then started putting weight on foot in CAM boot. - N/T/B.\"    \"left foot pain and swelling f/u. Had MRI showing fracture. Doing well in CAM boot but not NWB fully. Trying her best. Does have rolling knee scooter and crutches. Wants to go back to work.\"     Initial HPI \"Notes current issue has been present since January 3rd 2025 without trauma. Notes she has had pain and swelling to this same area in the past, but it has normally gone away. Has h/o stress fracture. " "Went to urgent care 1/9/25 and had XR taken showing \"calcification.\" Was placed in a tall CAM boot but patient notes this hurts the foot. Using Tylenol which helps a bit. Cannot take NSAIDs due to CKD.   Current smoker. + CKD.\"       Review of Systems   Constitutional:  Negative for activity change, chills and fever.   HENT: Negative.     Respiratory:  Negative for cough, chest tightness and shortness of breath.    Cardiovascular:  Negative for chest pain and leg swelling.   Endocrine: Negative.    Genitourinary: Negative.    Musculoskeletal:  Positive for gait problem.   Neurological:  Negative for numbness.   Psychiatric/Behavioral: Negative.  Negative for agitation and behavioral problems.           Objective   Ht 5' 4\" (1.626 m)   Wt 89.8 kg (198 lb)   BMI 33.99 kg/m²      Physical Exam  Constitutional:       General: She is not in acute distress.     Appearance: Normal appearance. She is not ill-appearing.     Cardiovascular:      Pulses: Normal pulses.      Comments: Left foot warm, pulses palpable, no calf pain.   Pulmonary:      Effort: Pulmonary effort is normal.     Musculoskeletal:         General: Swelling (left ankle), tenderness (+ tenderness dorsal-lateral talar head/anterior ankle, medial along PT tendon) and deformity (midfoot arthritic lipping left) present.      Comments: MMT 5/5 in all planes. Toe ROM intact and without pain.   Gastroc-soleal equinus.     Left foot pain resolved to 5th met base, 4/5 TMTJs extending to midshaft, heel, cuboid.    Slight pain with left ankle ROM.     Skin:     General: Skin is warm and dry.      Capillary Refill: Capillary refill takes less than 2 seconds.      Findings: No erythema or lesion.     Neurological:      General: No focal deficit present.      Mental Status: She is alert and oriented to person, place, and time.      Sensory: No sensory deficit.           "

## 2025-07-16 NOTE — ASSESSMENT & PLAN NOTE
- Nearly healed per MRI as below. Had been NWB 6wks followed by WBAT in tall CAM boot for another 4wks  - Bone stim pending  - CMO's rx'd via [facility] last appt, pt to schedule  - PT notes reviewed   - c/w supportive shoes

## 2025-07-16 NOTE — LETTER
July 16, 2025     Patient: Yakelin Earl  YOB: 1960  Date of Visit: 7/16/2025      To Whom it May Concern:    Yakelin Earl is under my professional care. Yakelin was seen in my office on 7/16/2025. Please excuse Yakelin from work for 2 weeks from today.     If you have any questions or concerns, please don't hesitate to call.         Sincerely,          Jeri Sierra DPM        CC: No Recipients

## 2025-07-16 NOTE — ASSESSMENT & PLAN NOTE
- New acute issue. Likely inflammatory due to over stress from transitioning out of boot and returning to work  - UC note from 7/3/25 reviewed. XR left ankle 7/3/25 3v reviewed: no acute abnormalities noted.    - I discussed CSI to left ankle joint but patient defers at this time  - medrol dose back rx'd  - no work for 2wks  - c/w light PT  - ankle brace applied  - f/u 2wks  Orders:    methylPREDNISolone 4 MG tablet therapy pack; Use as directed on package    Ankle Cude ankle/Ankle Brace

## 2025-07-21 ENCOUNTER — OFFICE VISIT (OUTPATIENT)
Dept: PHYSICAL THERAPY | Facility: CLINIC | Age: 65
End: 2025-07-21
Attending: STUDENT IN AN ORGANIZED HEALTH CARE EDUCATION/TRAINING PROGRAM
Payer: COMMERCIAL

## 2025-07-21 DIAGNOSIS — M79.672 PAIN OF LEFT HEEL: ICD-10-CM

## 2025-07-21 DIAGNOSIS — S92.215D CLOSED NONDISPLACED FRACTURE OF CUBOID OF LEFT FOOT WITH ROUTINE HEALING, SUBSEQUENT ENCOUNTER: Primary | ICD-10-CM

## 2025-07-21 PROCEDURE — 97110 THERAPEUTIC EXERCISES: CPT

## 2025-07-21 PROCEDURE — 97140 MANUAL THERAPY 1/> REGIONS: CPT

## 2025-07-21 NOTE — PROGRESS NOTES
Daily Note     Today's date: 2025  Patient name: Yakelin Earl  : 1960  MRN: [id number]  Referring provider: Jeri Sierra DPM  Dx:   Encounter Diagnosis     ICD-10-CM    1. Closed nondisplaced fracture of cuboid of left foot with routine healing, subsequent encounter  S92.215D       2. Pain of left heel  M79.672                      Subjective:  Patient reports she had f/u with podiatrist who has placed the patient back into the ankle splint and ordered a new course of Prednisone to assist with decreasing symptoms and placed patient off work for several days.        Objective: See treatment diary below      Assessment: Patient tolerated treatment well. PTA notes start of POC with focus of on TE/NMR and manual therapy to decrease symptoms and improve functional mobility in order to address goals set by PT. Patient demonstrates decreased ROM and strength in L foot. Patient to benefit from continued skilled PT services to improve L foot/ankle strength and mobility.        Plan: Continue per plan of care.      Precautions:  WBAT Left LE  POC 25      Manuals      Left foot and ankle mobs and stretching  10 mins gentle stretching &PROM 10 mins gentle stretching &PROM     Left Ankle MRE- All Directions                         Neuro Re-Ed        Rocker Board- Tandem F/B and S/S         Tandem on Foam with OH S/S         Tandem and Side Step Walk on Foam          3 Way SLR         Front and Retro Step Over        Foam Arch Lifts   15x 2X10     Ther Ex        SRC for ROM and Strength   Nu Step Lvl 1 10 mins Nu-Step   10 min L1     Front and Lateral Step Up         Seated Ankle I/E        Stand 1/2 Foam DF/PF and I/E    2x10 Each     Ankle Circles   20x CW/CCW 20X CW/CCW     Towel Toe Curls   20x 20x        Ankle Alphabet 1x      HEP update/review  15 min        Ther Activity                        Gait Training                        Modalities        CP PRN  10 mins  10 mins

## 2025-07-22 NOTE — PROGRESS NOTES
"Daily Note     Today's date: 2025  Patient name: Yakelin Earl  : 1960  MRN: [id number]  Referring provider: Jeri Sierra DPM  Dx:   Encounter Diagnosis     ICD-10-CM    1. Closed nondisplaced fracture of cuboid of left foot with routine healing, subsequent encounter  S92.215D       2. Pain of left heel  M79.672                      Subjective:  Patient reports the foot and ankle are feeling better this morning minimal soreness but patient reports fear with RTW with symptoms returning after several hours on her feet and busy walking during her work shift.  Patient denies any increase in symptoms t/o the session.        Objective: See treatment diary below      Assessment: Tolerated treatment well.  PT notes continuation of decrease ROM and strength t/o the left foot and ankle with demonstration of impaired gait pattern and balance s/p left foot fracture with need for continuation of skilled therapy.        Plan: Continue per plan of care.      Precautions:  WBAT Left LE  POC 25      Manuals     Left foot and ankle mobs and stretching  10 mins gentle stretching &PROM 10 mins gentle stretching &PROM 10 min    Left Ankle MRE- All Directions     5 min                     Neuro Re-Ed        Rocker Board- Tandem F/B and S/S     15X Each Bilat     Tandem on Foam with OH S/S         Tandem and Side Step Walk on Foam         [facility] March     1 min     [facility] 3 Way SLR         Front and Retro Step Over        Foam Arch Lifts   15x 2X10 2x10     Ther Ex        SRC for ROM and Strength   Nu Step Lvl 1 10 mins Nu-Step   10 min L1 SRC   10 Min L1     Front and Lateral Step Up         Seated Ankle I/E    With Towel 10x Each     Stand 1/2 Foam DF/PF and I/E    2x10 Each 10x5\" Each     Ankle Circles   20x CW/CCW 20X CW/CCW 20X CW/  CCW    Towel Toe Curls   20x 20x 2x10        Ankle Alphabet 1x  1X     HEP update/review  15 min        Ther Activity                        Gait Training             "            Modalities        CP PRN  10 mins 10 mins 15 min

## 2025-07-23 ENCOUNTER — OFFICE VISIT (OUTPATIENT)
Dept: PHYSICAL THERAPY | Facility: CLINIC | Age: 65
End: 2025-07-23
Attending: STUDENT IN AN ORGANIZED HEALTH CARE EDUCATION/TRAINING PROGRAM
Payer: COMMERCIAL

## 2025-07-23 DIAGNOSIS — M79.672 PAIN OF LEFT HEEL: ICD-10-CM

## 2025-07-23 DIAGNOSIS — S92.215D CLOSED NONDISPLACED FRACTURE OF CUBOID OF LEFT FOOT WITH ROUTINE HEALING, SUBSEQUENT ENCOUNTER: Primary | ICD-10-CM

## 2025-07-23 PROCEDURE — 97140 MANUAL THERAPY 1/> REGIONS: CPT | Performed by: PHYSICAL THERAPIST

## 2025-07-23 PROCEDURE — 97110 THERAPEUTIC EXERCISES: CPT | Performed by: PHYSICAL THERAPIST

## 2025-07-23 PROCEDURE — 97112 NEUROMUSCULAR REEDUCATION: CPT | Performed by: PHYSICAL THERAPIST

## 2025-07-28 ENCOUNTER — APPOINTMENT (OUTPATIENT)
Dept: PHYSICAL THERAPY | Facility: CLINIC | Age: 65
End: 2025-07-28
Attending: STUDENT IN AN ORGANIZED HEALTH CARE EDUCATION/TRAINING PROGRAM
Payer: COMMERCIAL

## 2025-07-30 ENCOUNTER — OFFICE VISIT (OUTPATIENT)
Dept: PHYSICAL THERAPY | Facility: CLINIC | Age: 65
End: 2025-07-30
Attending: STUDENT IN AN ORGANIZED HEALTH CARE EDUCATION/TRAINING PROGRAM
Payer: COMMERCIAL

## 2025-07-30 DIAGNOSIS — M79.672 PAIN OF LEFT HEEL: ICD-10-CM

## 2025-07-30 DIAGNOSIS — S92.215D CLOSED NONDISPLACED FRACTURE OF CUBOID OF LEFT FOOT WITH ROUTINE HEALING, SUBSEQUENT ENCOUNTER: Primary | ICD-10-CM

## 2025-07-30 PROCEDURE — 97112 NEUROMUSCULAR REEDUCATION: CPT

## 2025-07-30 PROCEDURE — 97110 THERAPEUTIC EXERCISES: CPT

## 2025-07-30 PROCEDURE — 97140 MANUAL THERAPY 1/> REGIONS: CPT

## 2025-07-31 ENCOUNTER — OFFICE VISIT (OUTPATIENT)
Dept: PODIATRY | Age: 65
End: 2025-07-31
Payer: COMMERCIAL

## 2025-07-31 VITALS — HEIGHT: 64 IN | WEIGHT: 194.4 LBS | BODY MASS INDEX: 33.19 KG/M2

## 2025-07-31 DIAGNOSIS — M84.375D STRESS REACTION OF LEFT FOOT WITH ROUTINE HEALING, SUBSEQUENT ENCOUNTER: ICD-10-CM

## 2025-07-31 DIAGNOSIS — M25.572 ACUTE LEFT ANKLE PAIN: Primary | ICD-10-CM

## 2025-07-31 DIAGNOSIS — S92.215K: ICD-10-CM

## 2025-07-31 PROCEDURE — 99213 OFFICE O/P EST LOW 20 MIN: CPT | Performed by: STUDENT IN AN ORGANIZED HEALTH CARE EDUCATION/TRAINING PROGRAM

## 2025-08-05 ENCOUNTER — OFFICE VISIT (OUTPATIENT)
Dept: PHYSICAL THERAPY | Facility: CLINIC | Age: 65
End: 2025-08-05
Attending: STUDENT IN AN ORGANIZED HEALTH CARE EDUCATION/TRAINING PROGRAM
Payer: COMMERCIAL

## 2025-08-05 DIAGNOSIS — S92.215D CLOSED NONDISPLACED FRACTURE OF CUBOID OF LEFT FOOT WITH ROUTINE HEALING, SUBSEQUENT ENCOUNTER: Primary | ICD-10-CM

## 2025-08-05 DIAGNOSIS — M79.672 PAIN OF LEFT HEEL: ICD-10-CM

## 2025-08-05 PROCEDURE — 97112 NEUROMUSCULAR REEDUCATION: CPT

## 2025-08-05 PROCEDURE — 97140 MANUAL THERAPY 1/> REGIONS: CPT

## 2025-08-05 PROCEDURE — 97110 THERAPEUTIC EXERCISES: CPT

## 2025-08-06 ENCOUNTER — OFFICE VISIT (OUTPATIENT)
Dept: PHYSICAL THERAPY | Facility: CLINIC | Age: 65
End: 2025-08-06
Attending: STUDENT IN AN ORGANIZED HEALTH CARE EDUCATION/TRAINING PROGRAM
Payer: COMMERCIAL

## 2025-08-06 DIAGNOSIS — M79.672 PAIN OF LEFT HEEL: ICD-10-CM

## 2025-08-06 DIAGNOSIS — S92.215D CLOSED NONDISPLACED FRACTURE OF CUBOID OF LEFT FOOT WITH ROUTINE HEALING, SUBSEQUENT ENCOUNTER: Primary | ICD-10-CM

## 2025-08-06 PROCEDURE — 97110 THERAPEUTIC EXERCISES: CPT

## 2025-08-06 PROCEDURE — 97140 MANUAL THERAPY 1/> REGIONS: CPT

## 2025-08-06 PROCEDURE — 97112 NEUROMUSCULAR REEDUCATION: CPT

## 2025-08-11 ENCOUNTER — EVALUATION (OUTPATIENT)
Dept: PHYSICAL THERAPY | Facility: CLINIC | Age: 65
End: 2025-08-11
Attending: STUDENT IN AN ORGANIZED HEALTH CARE EDUCATION/TRAINING PROGRAM
Payer: COMMERCIAL

## 2025-08-14 ENCOUNTER — OFFICE VISIT (OUTPATIENT)
Dept: PHYSICAL THERAPY | Facility: CLINIC | Age: 65
End: 2025-08-14
Attending: STUDENT IN AN ORGANIZED HEALTH CARE EDUCATION/TRAINING PROGRAM
Payer: COMMERCIAL

## 2025-08-19 ENCOUNTER — OFFICE VISIT (OUTPATIENT)
Dept: PHYSICAL THERAPY | Facility: CLINIC | Age: 65
End: 2025-08-19
Attending: STUDENT IN AN ORGANIZED HEALTH CARE EDUCATION/TRAINING PROGRAM
Payer: COMMERCIAL

## 2025-08-19 DIAGNOSIS — S92.215D CLOSED NONDISPLACED FRACTURE OF CUBOID OF LEFT FOOT WITH ROUTINE HEALING, SUBSEQUENT ENCOUNTER: Primary | ICD-10-CM

## 2025-08-19 DIAGNOSIS — M79.672 PAIN OF LEFT HEEL: ICD-10-CM

## 2025-08-19 PROCEDURE — 97110 THERAPEUTIC EXERCISES: CPT | Performed by: PHYSICAL THERAPIST

## 2025-08-19 PROCEDURE — 97112 NEUROMUSCULAR REEDUCATION: CPT | Performed by: PHYSICAL THERAPIST

## 2025-08-19 PROCEDURE — 97140 MANUAL THERAPY 1/> REGIONS: CPT | Performed by: PHYSICAL THERAPIST

## 2025-08-21 ENCOUNTER — OFFICE VISIT (OUTPATIENT)
Dept: PHYSICAL THERAPY | Facility: CLINIC | Age: 65
End: 2025-08-21
Attending: STUDENT IN AN ORGANIZED HEALTH CARE EDUCATION/TRAINING PROGRAM
Payer: COMMERCIAL

## 2025-08-21 DIAGNOSIS — M79.672 PAIN OF LEFT HEEL: ICD-10-CM

## 2025-08-21 DIAGNOSIS — S92.215D CLOSED NONDISPLACED FRACTURE OF CUBOID OF LEFT FOOT WITH ROUTINE HEALING, SUBSEQUENT ENCOUNTER: Primary | ICD-10-CM

## 2025-08-21 PROCEDURE — 97110 THERAPEUTIC EXERCISES: CPT | Performed by: PHYSICAL THERAPIST

## 2025-08-21 PROCEDURE — 97112 NEUROMUSCULAR REEDUCATION: CPT | Performed by: PHYSICAL THERAPIST

## 2025-08-21 PROCEDURE — 97140 MANUAL THERAPY 1/> REGIONS: CPT | Performed by: PHYSICAL THERAPIST
